# Patient Record
Sex: FEMALE | Race: WHITE | NOT HISPANIC OR LATINO | Employment: OTHER | URBAN - METROPOLITAN AREA
[De-identification: names, ages, dates, MRNs, and addresses within clinical notes are randomized per-mention and may not be internally consistent; named-entity substitution may affect disease eponyms.]

---

## 2018-01-18 ENCOUNTER — RECORDS - HEALTHEAST (OUTPATIENT)
Dept: LAB | Facility: CLINIC | Age: 68
End: 2018-01-18

## 2018-01-18 LAB
ANION GAP SERPL CALCULATED.3IONS-SCNC: 14 MMOL/L (ref 5–18)
BUN SERPL-MCNC: 16 MG/DL (ref 8–22)
CALCIUM SERPL-MCNC: 10.1 MG/DL (ref 8.5–10.5)
CHLORIDE BLD-SCNC: 105 MMOL/L (ref 98–107)
CHOLEST SERPL-MCNC: 226 MG/DL
CO2 SERPL-SCNC: 24 MMOL/L (ref 22–31)
CREAT SERPL-MCNC: 0.87 MG/DL (ref 0.6–1.1)
FASTING STATUS PATIENT QL REPORTED: NO
GFR SERPL CREATININE-BSD FRML MDRD: >60 ML/MIN/1.73M2
GLUCOSE BLD-MCNC: 86 MG/DL (ref 70–125)
HDLC SERPL-MCNC: 104 MG/DL
LDLC SERPL CALC-MCNC: 101 MG/DL
POTASSIUM BLD-SCNC: 5.1 MMOL/L (ref 3.5–5)
SODIUM SERPL-SCNC: 143 MMOL/L (ref 136–145)
TRIGL SERPL-MCNC: 106 MG/DL
TSH SERPL DL<=0.005 MIU/L-ACNC: 2.15 UIU/ML (ref 0.3–5)

## 2018-01-19 ENCOUNTER — RECORDS - HEALTHEAST (OUTPATIENT)
Dept: ADMINISTRATIVE | Facility: OTHER | Age: 68
End: 2018-01-19

## 2018-01-19 LAB
25(OH)D3 SERPL-MCNC: 49.2 NG/ML (ref 30–80)
HCV AB SERPL QL IA: NEGATIVE

## 2018-06-07 ENCOUNTER — RECORDS - HEALTHEAST (OUTPATIENT)
Dept: LAB | Facility: CLINIC | Age: 68
End: 2018-06-07

## 2018-06-07 LAB
CHOLEST SERPL-MCNC: 288 MG/DL
FASTING STATUS PATIENT QL REPORTED: NO
HDLC SERPL-MCNC: 92 MG/DL
LDLC SERPL CALC-MCNC: 175 MG/DL
TRIGL SERPL-MCNC: 107 MG/DL

## 2018-06-18 ENCOUNTER — AMBULATORY - HEALTHEAST (OUTPATIENT)
Dept: CARDIOLOGY | Facility: CLINIC | Age: 68
End: 2018-06-18

## 2018-06-18 ENCOUNTER — RECORDS - HEALTHEAST (OUTPATIENT)
Dept: ADMINISTRATIVE | Facility: OTHER | Age: 68
End: 2018-06-18

## 2018-06-20 ENCOUNTER — RECORDS - HEALTHEAST (OUTPATIENT)
Dept: ADMINISTRATIVE | Facility: OTHER | Age: 68
End: 2018-06-20

## 2018-06-20 ENCOUNTER — AMBULATORY - HEALTHEAST (OUTPATIENT)
Dept: CARDIOLOGY | Facility: CLINIC | Age: 68
End: 2018-06-20

## 2018-06-25 ENCOUNTER — OFFICE VISIT - HEALTHEAST (OUTPATIENT)
Dept: CARDIOLOGY | Facility: CLINIC | Age: 68
End: 2018-06-25

## 2018-06-25 DIAGNOSIS — R07.9 CHEST PAIN, UNSPECIFIED TYPE: ICD-10-CM

## 2018-06-25 DIAGNOSIS — E78.00 HYPERCHOLESTEROLEMIA: ICD-10-CM

## 2018-06-25 DIAGNOSIS — R00.2 PALPITATIONS: ICD-10-CM

## 2018-06-25 ASSESSMENT — MIFFLIN-ST. JEOR: SCORE: 984.68

## 2018-07-19 ENCOUNTER — COMMUNICATION - HEALTHEAST (OUTPATIENT)
Dept: CARDIOLOGY | Facility: HOSPITAL | Age: 68
End: 2018-07-19

## 2018-07-24 ENCOUNTER — HOSPITAL ENCOUNTER (OUTPATIENT)
Dept: CT IMAGING | Facility: CLINIC | Age: 68
Discharge: HOME | End: 2018-07-24
Attending: INTERNAL MEDICINE

## 2018-07-24 ENCOUNTER — HOSPITAL ENCOUNTER (OUTPATIENT)
Dept: CARDIOLOGY | Facility: CLINIC | Age: 68
Discharge: HOME | End: 2018-07-24
Attending: INTERNAL MEDICINE

## 2018-07-24 ENCOUNTER — COMMUNICATION - HEALTHEAST (OUTPATIENT)
Dept: CARDIOLOGY | Facility: CLINIC | Age: 68
End: 2018-07-24

## 2018-07-24 DIAGNOSIS — R07.9 CHEST PAIN, UNSPECIFIED TYPE: ICD-10-CM

## 2018-07-24 DIAGNOSIS — R00.2 PALPITATIONS: ICD-10-CM

## 2018-07-24 LAB
BSA FOR ECHO PROCEDURE: 1.51 M2
CREAT BLD-MCNC: 0.9 MG/DL
CV CALCIUM SCORE AGATSTON LM: 0
CV CALCIUM SCORING AGATSON LAD: 0
CV CALCIUM SCORING AGATSTON CX: 0
CV CALCIUM SCORING AGATSTON RCA: 0
CV CALCIUM SCORING AGATSTON TOTAL: 0
LEFT VENTRICLE HEART RATE: 75 BPM
POC GFR AMER AF HE - HISTORICAL: >60 ML/MIN/1.73M2
POC GFR NON AMER AF HE - HISTORICAL: >60 ML/MIN/1.73M2

## 2018-07-24 ASSESSMENT — MIFFLIN-ST. JEOR
SCORE: 984.68
SCORE: 984.68

## 2018-11-26 ENCOUNTER — RECORDS - HEALTHEAST (OUTPATIENT)
Dept: LAB | Facility: CLINIC | Age: 68
End: 2018-11-26

## 2018-11-26 LAB
CHOLEST SERPL-MCNC: 175 MG/DL
FASTING STATUS PATIENT QL REPORTED: NO
HDLC SERPL-MCNC: 80 MG/DL
LDLC SERPL CALC-MCNC: 74 MG/DL
TRIGL SERPL-MCNC: 107 MG/DL

## 2018-12-18 ENCOUNTER — RECORDS - HEALTHEAST (OUTPATIENT)
Dept: LAB | Facility: CLINIC | Age: 68
End: 2018-12-18

## 2018-12-18 LAB
ALBUMIN SERPL-MCNC: 4.2 G/DL (ref 3.5–5)
ALP SERPL-CCNC: 60 U/L (ref 45–120)
ALT SERPL W P-5'-P-CCNC: 34 U/L (ref 0–45)
ANION GAP SERPL CALCULATED.3IONS-SCNC: 13 MMOL/L (ref 5–18)
AST SERPL W P-5'-P-CCNC: 26 U/L (ref 0–40)
BILIRUB SERPL-MCNC: 0.7 MG/DL (ref 0–1)
BUN SERPL-MCNC: 14 MG/DL (ref 8–22)
CALCIUM SERPL-MCNC: 10.1 MG/DL (ref 8.5–10.5)
CHLORIDE BLD-SCNC: 103 MMOL/L (ref 98–107)
CO2 SERPL-SCNC: 23 MMOL/L (ref 22–31)
CREAT SERPL-MCNC: 0.85 MG/DL (ref 0.6–1.1)
GFR SERPL CREATININE-BSD FRML MDRD: >60 ML/MIN/1.73M2
GLUCOSE BLD-MCNC: 89 MG/DL (ref 70–125)
POTASSIUM BLD-SCNC: 4.2 MMOL/L (ref 3.5–5)
PROT SERPL-MCNC: 6.6 G/DL (ref 6–8)
SODIUM SERPL-SCNC: 139 MMOL/L (ref 136–145)
TSH SERPL DL<=0.005 MIU/L-ACNC: 1.87 UIU/ML (ref 0.3–5)

## 2018-12-19 LAB — 25(OH)D3 SERPL-MCNC: 47.3 NG/ML (ref 30–80)

## 2019-06-28 ENCOUNTER — RECORDS - HEALTHEAST (OUTPATIENT)
Dept: LAB | Facility: CLINIC | Age: 69
End: 2019-06-28

## 2019-06-29 LAB — BACTERIA SPEC CULT: NO GROWTH

## 2019-09-25 ENCOUNTER — RECORDS - HEALTHEAST (OUTPATIENT)
Dept: LAB | Facility: CLINIC | Age: 69
End: 2019-09-25

## 2019-09-25 LAB
ALBUMIN SERPL-MCNC: 4.1 G/DL (ref 3.5–5)
ALP SERPL-CCNC: 72 U/L (ref 45–120)
ALT SERPL W P-5'-P-CCNC: 20 U/L (ref 0–45)
ANION GAP SERPL CALCULATED.3IONS-SCNC: 7 MMOL/L (ref 5–18)
AST SERPL W P-5'-P-CCNC: 25 U/L (ref 0–40)
BILIRUB SERPL-MCNC: 0.6 MG/DL (ref 0–1)
BUN SERPL-MCNC: 13 MG/DL (ref 8–22)
C REACTIVE PROTEIN LHE: 0.1 MG/DL (ref 0–0.8)
CALCIUM SERPL-MCNC: 9.9 MG/DL (ref 8.5–10.5)
CHLORIDE BLD-SCNC: 105 MMOL/L (ref 98–107)
CO2 SERPL-SCNC: 26 MMOL/L (ref 22–31)
CREAT SERPL-MCNC: 0.8 MG/DL (ref 0.6–1.1)
FOLATE SERPL-MCNC: 9.6 NG/ML
GFR SERPL CREATININE-BSD FRML MDRD: >60 ML/MIN/1.73M2
GLUCOSE BLD-MCNC: 91 MG/DL (ref 70–125)
POTASSIUM BLD-SCNC: 4.1 MMOL/L (ref 3.5–5)
PROT SERPL-MCNC: 6.5 G/DL (ref 6–8)
SODIUM SERPL-SCNC: 138 MMOL/L (ref 136–145)
TSH SERPL DL<=0.005 MIU/L-ACNC: 1.25 UIU/ML (ref 0.3–5)
VIT B12 SERPL-MCNC: 359 PG/ML (ref 213–816)

## 2019-09-26 LAB — 25(OH)D3 SERPL-MCNC: 53.4 NG/ML (ref 30–80)

## 2020-02-03 ENCOUNTER — RECORDS - HEALTHEAST (OUTPATIENT)
Dept: LAB | Facility: CLINIC | Age: 70
End: 2020-02-03

## 2020-02-03 LAB
ALBUMIN SERPL-MCNC: 3.9 G/DL (ref 3.5–5)
ALP SERPL-CCNC: 57 U/L (ref 45–120)
ALT SERPL W P-5'-P-CCNC: 27 U/L (ref 0–45)
ANION GAP SERPL CALCULATED.3IONS-SCNC: 10 MMOL/L (ref 5–18)
AST SERPL W P-5'-P-CCNC: 23 U/L (ref 0–40)
BILIRUB SERPL-MCNC: 0.6 MG/DL (ref 0–1)
BUN SERPL-MCNC: 14 MG/DL (ref 8–22)
CALCIUM SERPL-MCNC: 9.6 MG/DL (ref 8.5–10.5)
CHLORIDE BLD-SCNC: 107 MMOL/L (ref 98–107)
CHOLEST SERPL-MCNC: 203 MG/DL
CO2 SERPL-SCNC: 26 MMOL/L (ref 22–31)
CREAT SERPL-MCNC: 0.77 MG/DL (ref 0.6–1.1)
FASTING STATUS PATIENT QL REPORTED: YES
GFR SERPL CREATININE-BSD FRML MDRD: >60 ML/MIN/1.73M2
GLUCOSE BLD-MCNC: 86 MG/DL (ref 70–125)
HDLC SERPL-MCNC: 85 MG/DL
LDLC SERPL CALC-MCNC: 88 MG/DL
POTASSIUM BLD-SCNC: 4.1 MMOL/L (ref 3.5–5)
PROT SERPL-MCNC: 6.2 G/DL (ref 6–8)
SODIUM SERPL-SCNC: 143 MMOL/L (ref 136–145)
TRIGL SERPL-MCNC: 152 MG/DL

## 2021-04-16 ENCOUNTER — RECORDS - HEALTHEAST (OUTPATIENT)
Dept: ADMINISTRATIVE | Facility: OTHER | Age: 71
End: 2021-04-16

## 2021-04-16 ENCOUNTER — AMBULATORY - HEALTHEAST (OUTPATIENT)
Dept: CARDIOLOGY | Facility: CLINIC | Age: 71
End: 2021-04-16

## 2021-04-22 ENCOUNTER — RECORDS - HEALTHEAST (OUTPATIENT)
Dept: LAB | Facility: CLINIC | Age: 71
End: 2021-04-22

## 2021-04-22 LAB
ANION GAP SERPL CALCULATED.3IONS-SCNC: 7 MMOL/L (ref 5–18)
BUN SERPL-MCNC: 12 MG/DL (ref 8–28)
CALCIUM SERPL-MCNC: 9.5 MG/DL (ref 8.5–10.5)
CHLORIDE BLD-SCNC: 107 MMOL/L (ref 98–107)
CHOLEST SERPL-MCNC: 169 MG/DL
CO2 SERPL-SCNC: 30 MMOL/L (ref 22–31)
CREAT SERPL-MCNC: 0.74 MG/DL (ref 0.6–1.1)
FASTING STATUS PATIENT QL REPORTED: NO
GFR SERPL CREATININE-BSD FRML MDRD: >60 ML/MIN/1.73M2
GLUCOSE BLD-MCNC: 86 MG/DL (ref 70–125)
HDLC SERPL-MCNC: 73 MG/DL
LDLC SERPL CALC-MCNC: 75 MG/DL
POTASSIUM BLD-SCNC: 4.1 MMOL/L (ref 3.5–5)
SODIUM SERPL-SCNC: 144 MMOL/L (ref 136–145)
TRIGL SERPL-MCNC: 107 MG/DL

## 2021-05-24 ENCOUNTER — RECORDS - HEALTHEAST (OUTPATIENT)
Dept: ADMINISTRATIVE | Facility: OTHER | Age: 71
End: 2021-05-24

## 2021-05-24 ENCOUNTER — RECORDS - HEALTHEAST (OUTPATIENT)
Dept: CARDIOLOGY | Facility: CLINIC | Age: 71
End: 2021-05-24

## 2021-05-25 ENCOUNTER — RECORDS - HEALTHEAST (OUTPATIENT)
Dept: ADMINISTRATIVE | Facility: CLINIC | Age: 71
End: 2021-05-25

## 2021-05-27 ENCOUNTER — OFFICE VISIT - HEALTHEAST (OUTPATIENT)
Dept: CARDIOLOGY | Facility: CLINIC | Age: 71
End: 2021-05-27

## 2021-05-27 DIAGNOSIS — E78.00 HYPERCHOLESTEROLEMIA: ICD-10-CM

## 2021-05-27 DIAGNOSIS — R00.2 PALPITATIONS: ICD-10-CM

## 2021-05-27 DIAGNOSIS — I34.0 NONRHEUMATIC MITRAL VALVE REGURGITATION: ICD-10-CM

## 2021-05-27 DIAGNOSIS — R07.9 CHEST PAIN, UNSPECIFIED TYPE: ICD-10-CM

## 2021-05-27 ASSESSMENT — MIFFLIN-ST. JEOR: SCORE: 1011.9

## 2021-05-28 ENCOUNTER — RECORDS - HEALTHEAST (OUTPATIENT)
Dept: ADMINISTRATIVE | Facility: CLINIC | Age: 71
End: 2021-05-28

## 2021-06-01 VITALS — HEIGHT: 61 IN | BODY MASS INDEX: 22.28 KG/M2 | WEIGHT: 118 LBS

## 2021-06-01 VITALS — WEIGHT: 118 LBS | HEIGHT: 61 IN | BODY MASS INDEX: 22.28 KG/M2

## 2021-06-10 ENCOUNTER — HOSPITAL ENCOUNTER (OUTPATIENT)
Dept: CARDIOLOGY | Facility: HOSPITAL | Age: 71
Discharge: HOME | End: 2021-06-10
Attending: INTERNAL MEDICINE
Payer: COMMERCIAL

## 2021-06-10 DIAGNOSIS — R00.2 PALPITATIONS: ICD-10-CM

## 2021-06-19 NOTE — PROGRESS NOTES
Patient is going to her cabin for the month of August with poor cell phone service After discussion with patient, patient would like to do UMANG monitor for 30 days in September when back from cabin vacation. Patient has also stated she has not had palpitations for 3 weeks now. Patient will call to make appointment after vacation.

## 2021-06-19 NOTE — PROGRESS NOTES
CTA with calcium score complete.  Rhythm SR 70's.  Metoprolol 5 mg given IV once.  Instructed to increase fluids throughout the day.  Interpretation pending.

## 2021-06-23 ENCOUNTER — HOSPITAL ENCOUNTER (OUTPATIENT)
Dept: CARDIOLOGY | Facility: HOSPITAL | Age: 71
Discharge: HOME | End: 2021-06-23
Attending: INTERNAL MEDICINE
Payer: COMMERCIAL

## 2021-06-23 DIAGNOSIS — I34.0 NONRHEUMATIC MITRAL VALVE REGURGITATION: ICD-10-CM

## 2021-06-23 DIAGNOSIS — R00.2 PALPITATIONS: ICD-10-CM

## 2021-06-23 LAB
AORTIC ROOT: 3 CM
AORTIC VALVE MEAN VELOCITY: 86 CM/S
AV DIMENSIONLESS INDEX VTI: 0.7
AV MEAN GRADIENT: 3 MMHG
AV PEAK GRADIENT: 5.9 MMHG
AV VALVE AREA: 2.1 CM2
BSA FOR ECHO PROCEDURE: 1.54 M2
CV BLOOD PRESSURE: NORMAL MMHG
CV ECHO HEIGHT: 60.1 IN
CV ECHO WEIGHT: 124 LBS
DOP CALC AO PEAK VEL: 121 CM/S
DOP CALC AO VTI: 25 CM
DOP CALC LVOT AREA: 2.83 CM2
DOP CALC LVOT DIAMETER: 1.9 CM
DOP CALC LVOT STROKE VOLUME: 52.7 CM3
DOP CALCLVOT PEAK VEL VTI: 18.6 CM
EJECTION FRACTION: 63 % (ref 55–75)
FRACTIONAL SHORTENING: 34.1 % (ref 28–44)
INTERVENTRICULAR SEPTUM IN END DIASTOLE: 0.6 CM (ref 0.6–0.9)
IVS/PW RATIO: 0.8
LA AREA 1: 14.4 CM2
LA AREA 2: 13.1 CM2
LEFT ATRIUM LENGTH: 4.29 CM
LEFT ATRIUM SIZE: 3.2 CM
LEFT ATRIUM TO AORTIC ROOT RATIO: 1.07 NO UNITS
LEFT ATRIUM VOLUME INDEX: 24.3 ML/M2
LEFT ATRIUM VOLUME: 37.4 ML
LEFT VENTRICLE DIASTOLIC VOLUME INDEX: 38.3 CM3/M2 (ref 29–61)
LEFT VENTRICLE DIASTOLIC VOLUME: 59 CM3 (ref 46–106)
LEFT VENTRICLE MASS INDEX: 53 G/M2
LEFT VENTRICLE SYSTOLIC VOLUME INDEX: 14.3 CM3/M2 (ref 8–24)
LEFT VENTRICLE SYSTOLIC VOLUME: 22 CM3 (ref 14–42)
LEFT VENTRICULAR INTERNAL DIMENSION IN DIASTOLE: 4.1 CM (ref 3.8–5.2)
LEFT VENTRICULAR INTERNAL DIMENSION IN SYSTOLE: 2.7 CM (ref 2.2–3.5)
LEFT VENTRICULAR MASS: 81.7 G
LEFT VENTRICULAR OUTFLOW TRACT MEAN GRADIENT: 2 MMHG
LEFT VENTRICULAR OUTFLOW TRACT MEAN VELOCITY: 60.9 CM/S
LEFT VENTRICULAR POSTERIOR WALL IN END DIASTOLE: 0.8 CM (ref 0.6–0.9)
LV STROKE VOLUME INDEX: 34.2 ML/M2
MITRAL VALVE E/A RATIO: 0.7
MV AVERAGE E/E' RATIO: 7.8 CM/S
MV DECELERATION TIME: 236 MS
MV E'TISSUE VEL-LAT: 8.77 CM/S
MV E'TISSUE VEL-MED: 5.95 CM/S
MV LATERAL E/E' RATIO: 6.5
MV MEDIAL E/E' RATIO: 9.6
MV PEAK A VELOCITY: 78 CM/S
MV PEAK E VELOCITY: 57.3 CM/S
NUC REST DIASTOLIC VOLUME INDEX: 1984 LBS
NUC REST SYSTOLIC VOLUME INDEX: 60.05 IN
TRICUSPID REGURGITATION PEAK PRESSURE GRADIENT: 20.1 MMHG
TRICUSPID VALVE ANULAR PLANE SYSTOLIC EXCURSION: 1.9 CM
TRICUSPID VALVE PEAK REGURGITANT VELOCITY: 224 CM/S

## 2021-06-23 ASSESSMENT — MIFFLIN-ST. JEOR: SCORE: 1004.75

## 2021-06-26 NOTE — PATIENT INSTRUCTIONS - HE
1. Continue current medications  2. Set up echo and event monitor and we will contact you with results

## 2021-06-26 NOTE — PROGRESS NOTES
Progress Notes by Jennifer Chung MD at 6/25/2018  1:50 PM     Author: Jennifer Chung MD Service: -- Author Type: Physician    Filed: 6/25/2018  3:11 PM Encounter Date: 6/25/2018 Status: Signed    : Jennifer Chung MD (Physician)           Click to link to [facility] HEART CARE NOTE    Thank you, Dr. Lombardo, for asking the [facility] Heart Care team to see Ms. Radha Bull to evaluate palpitations and intermittent chest pain.    Assessment/Recommendations   Assessment:    1.  Palpitations, etiology unclear.  These may represent occasional PACs or PVCs but cannot exclude paroxysms of atrial fibrillation.  Her episodes appear to occur sporadically, roughly every 2-3 weeks.  She did wear a 2 week event monitor 17 years ago which was unremarkable.  I suggested a 4 week event monitor.  2.  Intermittent chest pain.  She has had a history of intermittent chest pain dating back nearly 20 years now.  She did undergo a stress echo study in 2001 which was negative for ischemia.  I did suggest consideration of CT coronary angiography as this would also allow us to assess plaque burden even if no significant disease is identified given her risk factor of hypercholesterolemia.  Did explain the procedure with her and she is agreeable to proceed.  3.  Hypercholesterolemia, now back on simvastatin      Plan:  1.  Arrange outpatient CT coronary angiography in 4 week event monitor.  Further recommendations pending those results.       History of Present Illness    Ms. Radha Bull is a 68 y.o. female with history of intermittent chest pain but negative stress echo studies in the past, the last in 2005, and palpitations presents today for follow-up visit.  Patient states that intermittently over the past year, she has experienced episodes of chest discomfort which she describes as squeezing in nature and substernal in location.  There is no radiation to the neck, jaw or arms.  She denies any associated  "nausea or diaphoresis but does feel mildly dyspneic.  The episodes last about 10 minutes and resolved.  There does not appear to be any precipitating factor.  As above, she has undergone stress echo studies in 2001 and again in 2005 both of which were unremarkable.  She also reports symptoms of palpitations which she describes as a sense of \"a tennis shoe rolling around in a dryer\".  This also appears to occur sporadically.  Again no precipitating event such as stress or caffeine use.    ECG (personally reviewed): Recent ECG performed at [facility] demonstrating normal sinus rhythm without acute changes    Cardiac Imaging Studies (personally reviewed): No recent cardiac imaging     Physical Examination Review of Systems   Vitals:    06/25/18 1424   BP: 120/80   Pulse: 72   Resp: 16     Body mass index is 22.67 kg/(m^2).  Wt Readings from Last 3 Encounters:   06/25/18 118 lb (53.5 kg)   08/25/16 130 lb (59 kg)     General Appearance:   Awake, Alert, No acute distress.   HEENT:  No scleral icterus; the mucous membranes were pink and moist.   Neck: No cervical bruits or jugular venous distention    Chest: The spine was straight. The chest was symmetric.   Lungs:   Respirations unlabored; the lungs are clear to auscultation. No wheezing   Cardiovascular:    Regular rate and rhythm.  S1, S2 normal.  No murmur, gallop or rub   Abdomen:  No organomegaly, masses, bruits, or tenderness. Bowels sounds are present   Extremities:  No peripheral edema, clubbing or cyanosis.  Distal pulses are symmetric.   Skin: No xanthelasma. Warm, Dry.   Musculoskeletal: No tenderness.   Neurologic: Mood and affect are appropriate.    General: WNL  Eyes: WNL  Ears/Nose/Throat: WNL  Lungs: Shortness of Breath  Heart: Chest Pain, Shortness of Breath with activity, Irregular Heartbeat  Stomach: Diarrhea  Bladder: WNL  Muscle/Joints: Joint Pain, Muscle Weakness  Skin: WNL  Nervous System: WNL  Mental Health: Depression, Anxiety     Blood: " Easy Bruising     Medical History  Surgical History Family History Social History   Past Medical History:   Diagnosis Date   ? Autoimmune disorder (H)    ? Fibromyalgia    ? History of anesthesia complications     vomiting and headache   ? Lupus    ? Migraine headache     No past surgical history on file. Family History   Problem Relation Age of Onset   ? Cancer Father    ? Multiple sclerosis Sister     Social History     Social History   ? Marital status:      Spouse name: N/A   ? Number of children: N/A   ? Years of education: N/A     Occupational History   ? Not on file.     Social History Main Topics   ? Smoking status: Never Smoker   ? Smokeless tobacco: Never Used   ? Alcohol use No   ? Drug use: No   ? Sexual activity: Not on file     Other Topics Concern   ? Not on file     Social History Narrative          Medications  Allergies   Current Outpatient Prescriptions   Medication Sig Dispense Refill   ? alendronate (FOSAMAX) 35 MG tablet Take 35 mg by mouth every 7 days. Take in the morning on an empty stomach with a full glass of water 30 minutes before food     ? buPROPion (WELLBUTRIN XL) 300 MG 24 hr tablet Take 300 mg by mouth daily.     ? cholecalciferol, vitamin D3, 1,000 unit tablet Take 2,000 Units by mouth daily.     ? escitalopram oxalate (LEXAPRO) 20 MG tablet Take 30 mg by mouth daily.     ? HYDROcodone-acetaminophen 5-325 mg per tablet Take 1 tablet by mouth every 6 (six) hours as needed for pain.     ? lidocaine (LIDODERM) 5 % Place 1-2 patches on the skin daily as needed. Remove & Discard patch within 12 hours or as directed by MD     ? pramipexole (MIRAPEX) 0.125 MG tablet Take 0.125 mg by mouth daily as needed.     ? propranolol (INDERAL) 10 MG tablet Take 10 mg by mouth 2 (two) times a day as needed.     ? rizatriptan (MAXALT) 5 MG tablet Take 5 mg by mouth daily as needed.     ? simvastatin (ZOCOR) 40 MG tablet Take 20 mg by mouth at bedtime.      ? diphenoxylate-atropine (LOMOTIL)  2.5-0.025 mg per tablet Take 1 tablet by mouth every morning.     ? eszopiclone (LUNESTA) tablet Take 1.5 mg by mouth bedtime. Take immediately before bedtime     ? ondansetron (ZOFRAN ODT) 4 MG disintegrating tablet Take 1 tablet (4 mg total) by mouth every 8 (eight) hours as needed for nausea. 8 tablet 0   ? ZOLMitriptan (ZOMIG) 2.5 MG tablet Take 2.5 mg by mouth see administration instructions. Take 2.5 mg at onset of headache. May repeat 2.5 mg in 1 hour as needed for ongoing headache.       No current facility-administered medications for this visit.       Allergies   Allergen Reactions   ? Droperidol Other (See Comments)     Lock jaw   ? Penicillins Hives   ? Sulfa (Sulfonamide Antibiotics) Nausea And Vomiting   ? Sulfacetamide Sodium Nausea And Vomiting         Lab Results    Chemistry/lipid CBC Cardiac Enzymes/BNP/TSH/INR   Lab Results   Component Value Date    CHOL 288 (H) 06/07/2018    HDL 92 06/07/2018    LDLCALC 175 (H) 06/07/2018    TRIG 107 06/07/2018    CREATININE 0.87 01/18/2018    BUN 16 01/18/2018    K 5.1 (H) 01/18/2018     01/18/2018     01/18/2018    CO2 24 01/18/2018    Lab Results   Component Value Date    WBC 5.1 08/25/2016    HGB 13.6 08/25/2016    HCT 41.3 08/25/2016    MCV 96 08/25/2016     08/25/2016    Lab Results   Component Value Date    CKTOTAL 124 10/01/2015    TSH 2.15 01/18/2018

## 2021-07-04 NOTE — PROGRESS NOTES
Progress Notes by Jennifer Chung MD at 5/27/2021 11:10 AM     Author: Jennifer Chung MD Service: -- Author Type: Physician    Filed: 5/27/2021  2:24 PM Encounter Date: 5/27/2021 Status: Signed    : Jennifer Chung MD (Physician)           Thank you, Dr. Lombardo, for asking the [facility] Heart Care team to see Ms. Radha Bull to evaluate palpitations and chest pain.    Assessment/Recommendations   Assessment:    1.  Palpitations, etiology unclear but based on her description, wonder whether it may be due to PVCs or paroxysms of atrial fibrillation.  When I saw her 3 years ago, I recommended 4-week event monitor in the hopes of making a diagnosis.  She never followed through on this.  Recommended we reschedule her for 1 now.  2.  Chest pain, possibly related to #1.  This was also complained 3 years ago.  We subsequently pursued CT coronary angiography which was normal.  No additional ischemic workup needed.  3.  Hypercholesterolemia, back on low-dose simvastatin  4.  Mild mitral insufficiency.  This was documented on echocardiogram many years ago.  Did recommend follow-up echo.    Plan:  1.  Continue current medications  2.  Pursue 4-week event monitor as previously recommended.  We will follow-up once monitoring is completed.  3.  Schedule echocardiogram to reevaluate mild valvular heart disease.       History of Present Illness    Ms. Radha Bull is a 71 y.o. female with history of lupus, migraine headaches, fibromyalgia who I have seen intermittently for symptoms of chest pain.  She had undergone stress studies in the past which were unrevealing.  3 years ago, she presented back to the office with complaints of intermittent chest pain as well as palpitations.  At that point recommended CT coronary angiography as well as a 4-week event monitor since her episodes of palpitations did not occur on a daily basis.  The CT coronary angiography was normal with a calcium score of 0 and minimal plaquing noted  in her coronary arteries.  Unfortunately she did not follow through on the 4-week event monitor.    Today, she returns to the office again with complaints of some intermittent chest pain as well as palpitations.  She states she feels that her heart is flopping all over when the symptoms occur.  Denies any prolonged episodes or associated lightheadedness or near syncope.  Unclear if the chest discomfort correlates with the symptoms.    ECG (personally reviewed): No ECG today    Cardiac Imaging Studies (personally reviewed): No recent imaging     Physical Examination Review of Systems   Vitals:    05/27/21 1123   BP: 128/70   Pulse: 73   Resp: 14     Body mass index is 23.82 kg/m .  Wt Readings from Last 3 Encounters:   05/27/21 124 lb (56.2 kg)   07/24/18 118 lb (53.5 kg)   06/25/18 118 lb (53.5 kg)     General Appearance:   Awake, Alert, No acute distress.   HEENT:  No scleral icterus; the mucous membranes were pink and moist.   Neck: No cervical bruits or jugular venous distention    Chest: The spine was straight. The chest was symmetric.   Lungs:   Respirations unlabored; the lungs are clear to auscultation. No wheezing   Cardiovascular:    Regular rate and rhythm.  S1, S2 normal.  1/6 holosystolic murmur heard at the apex   Abdomen:  No organomegaly, masses, bruits, or tenderness. Bowels sounds are present   Extremities:  No peripheral edema bilaterally.   Skin: No xanthelasma. Warm, Dry.   Musculoskeletal: No tenderness.   Neurologic: Mood and affect are appropriate.    General: WNL  Eyes: WNL  Ears/Nose/Throat: Hearing Loss  Lungs: WNL  Heart: Chest Pain, Shortness of Breath with activity, Irregular Heartbeat  Stomach: WNL  Bladder: WNL  Muscle/Joints: Joint Pain  Skin: WNL  Nervous System: Loss of Balance  Mental Health: Depression     Blood: Easy Bleeding, Easy Bruising     Medical History  Surgical History Family History Social History   Past Medical History:   Diagnosis Date     Autoimmune disorder (H)       Fibromyalgia      History of anesthesia complications     vomiting and headache     Lupus      Migraine headache     No past surgical history on file. Family History   Problem Relation Age of Onset     Cancer Father      Multiple sclerosis Sister     Social History     Socioeconomic History     Marital status:      Spouse name: Not on file     Number of children: Not on file     Years of education: Not on file     Highest education level: Not on file   Occupational History     Not on file   Social Needs     Financial resource strain: Not on file     Food insecurity     Worry: Not on file     Inability: Not on file     Transportation needs     Medical: Not on file     Non-medical: Not on file   Tobacco Use     Smoking status: Never Smoker     Smokeless tobacco: Never Used   Substance and Sexual Activity     Alcohol use: No     Drug use: No     Sexual activity: Not on file   Lifestyle     Physical activity     Days per week: Not on file     Minutes per session: Not on file     Stress: Not on file   Relationships     Social connections     Talks on phone: Not on file     Gets together: Not on file     Attends Shinto service: Not on file     Active member of club or organization: Not on file     Attends meetings of clubs or organizations: Not on file     Relationship status: Not on file     Intimate partner violence     Fear of current or ex partner: Not on file     Emotionally abused: Not on file     Physically abused: Not on file     Forced sexual activity: Not on file   Other Topics Concern     Not on file   Social History Narrative     Not on file          Medications  Allergies   Current Outpatient Medications   Medication Sig Dispense Refill     buPROPion (WELLBUTRIN XL) 300 MG 24 hr tablet Take 300 mg by mouth daily.       cholecalciferol, vitamin D3, 1,000 unit tablet Take 2,000 Units by mouth daily.       diphenoxylate-atropine (LOMOTIL) 2.5-0.025 mg per tablet Take 1 tablet by mouth every morning.        escitalopram oxalate (LEXAPRO) 20 MG tablet Take 30 mg by mouth daily. 15 mg daily       eszopiclone (LUNESTA) tablet Take 1.5 mg by mouth bedtime. Take immediately before bedtime       HYDROcodone-acetaminophen 5-325 mg per tablet Take 1 tablet by mouth every 6 (six) hours as needed for pain.       pramipexole (MIRAPEX) 0.125 MG tablet Take 0.125 mg by mouth daily as needed.       propranolol (INDERAL) 10 MG tablet Take 10 mg by mouth 2 (two) times a day as needed.       rizatriptan (MAXALT) 5 MG tablet Take 5 mg by mouth daily as needed.       simvastatin (ZOCOR) 40 MG tablet Take 20 mg by mouth at bedtime.        No current facility-administered medications for this visit.       Allergies   Allergen Reactions     Droperidol Other (See Comments)     Lock jaw     Penicillins Hives     Sulfa (Sulfonamide Antibiotics) Nausea And Vomiting     Sulfacetamide Sodium Nausea And Vomiting         Lab Results    Chemistry/lipid CBC Cardiac Enzymes/BNP/TSH/INR   Lab Results   Component Value Date    CHOL 169 04/22/2021    HDL 73 04/22/2021    LDLCALC 75 04/22/2021    TRIG 107 04/22/2021    CREATININE 0.74 04/22/2021    BUN 12 04/22/2021    K 4.1 04/22/2021     04/22/2021     04/22/2021    CO2 30 04/22/2021    Lab Results   Component Value Date    WBC 5.1 08/25/2016    HGB 13.6 08/25/2016    HCT 41.3 08/25/2016    MCV 96 08/25/2016     08/25/2016    Lab Results   Component Value Date    CKTOTAL 124 10/01/2015    TSH 1.25 09/25/2019        A total of 40 minutes was spent reviewing patient's medical records, obtaining history and performing examination, as well as discussing diagnoses/ recommendations with patient and answering all questions.

## 2021-07-06 VITALS — BODY MASS INDEX: 24.35 KG/M2 | HEIGHT: 60 IN | WEIGHT: 124 LBS

## 2021-07-06 VITALS
BODY MASS INDEX: 23.41 KG/M2 | HEART RATE: 73 BPM | HEIGHT: 61 IN | WEIGHT: 124 LBS | RESPIRATION RATE: 14 BRPM | DIASTOLIC BLOOD PRESSURE: 70 MMHG | SYSTOLIC BLOOD PRESSURE: 128 MMHG

## 2021-09-10 DIAGNOSIS — Z11.59 ENCOUNTER FOR SCREENING FOR OTHER VIRAL DISEASES: ICD-10-CM

## 2021-10-14 ENCOUNTER — LAB REQUISITION (OUTPATIENT)
Dept: LAB | Facility: CLINIC | Age: 71
End: 2021-10-14
Payer: MEDICARE

## 2021-10-14 DIAGNOSIS — E55.9 VITAMIN D DEFICIENCY, UNSPECIFIED: ICD-10-CM

## 2021-10-14 DIAGNOSIS — Z01.818 ENCOUNTER FOR OTHER PREPROCEDURAL EXAMINATION: ICD-10-CM

## 2021-10-14 PROCEDURE — 82306 VITAMIN D 25 HYDROXY: CPT | Mod: ORL | Performed by: PHYSICIAN ASSISTANT

## 2021-10-14 PROCEDURE — 80048 BASIC METABOLIC PNL TOTAL CA: CPT | Mod: ORL | Performed by: PHYSICIAN ASSISTANT

## 2021-10-14 RX ORDER — ESCITALOPRAM OXALATE 10 MG/1
15 TABLET ORAL DAILY
COMMUNITY

## 2021-10-14 RX ORDER — MELATONIN 3 MG
1 TABLET ORAL
COMMUNITY

## 2021-10-14 RX ORDER — CHOLECALCIFEROL (VITAMIN D3) 50 MCG
1 TABLET ORAL DAILY
COMMUNITY

## 2021-10-14 RX ORDER — PRAMIPEXOLE DIHYDROCHLORIDE 0.12 MG/1
0.12 TABLET ORAL DAILY PRN
COMMUNITY

## 2021-10-14 RX ORDER — LOPERAMIDE HCL 2 MG
2 CAPSULE ORAL
COMMUNITY

## 2021-10-14 RX ORDER — SENNOSIDES 8.6 MG
650 CAPSULE ORAL EVERY 8 HOURS PRN
Status: ON HOLD | COMMUNITY
End: 2021-10-18

## 2021-10-14 RX ORDER — ESZOPICLONE 3 MG/1
3 TABLET, FILM COATED ORAL AT BEDTIME
COMMUNITY

## 2021-10-14 RX ORDER — BUPROPION HYDROCHLORIDE 300 MG/1
300 TABLET ORAL EVERY MORNING
COMMUNITY

## 2021-10-14 RX ORDER — DICLOFENAC SODIUM 30 MG/G
GEL TOPICAL DAILY PRN
COMMUNITY

## 2021-10-14 RX ORDER — TRIAMCINOLONE ACETONIDE 1 MG/G
OINTMENT TOPICAL DAILY PRN
COMMUNITY

## 2021-10-14 RX ORDER — DICLOFENAC SODIUM 75 MG/1
75 TABLET, DELAYED RELEASE ORAL DAILY PRN
Status: ON HOLD | COMMUNITY
End: 2021-10-18

## 2021-10-14 RX ORDER — RIZATRIPTAN BENZOATE 5 MG/1
5 TABLET ORAL
COMMUNITY

## 2021-10-14 RX ORDER — SIMVASTATIN 40 MG
40 TABLET ORAL EVERY OTHER DAY
COMMUNITY

## 2021-10-14 RX ORDER — PROPRANOLOL HYDROCHLORIDE 10 MG/1
10 TABLET ORAL DAILY PRN
COMMUNITY

## 2021-10-15 ENCOUNTER — LAB (OUTPATIENT)
Dept: LAB | Facility: CLINIC | Age: 71
End: 2021-10-15
Attending: ORTHOPAEDIC SURGERY
Payer: MEDICARE

## 2021-10-15 DIAGNOSIS — Z11.59 ENCOUNTER FOR SCREENING FOR OTHER VIRAL DISEASES: ICD-10-CM

## 2021-10-15 LAB
ANION GAP SERPL CALCULATED.3IONS-SCNC: 10 MMOL/L (ref 5–18)
BUN SERPL-MCNC: 11 MG/DL (ref 8–28)
CALCIUM SERPL-MCNC: 9.7 MG/DL (ref 8.5–10.5)
CHLORIDE BLD-SCNC: 104 MMOL/L (ref 98–107)
CO2 SERPL-SCNC: 27 MMOL/L (ref 22–31)
CREAT SERPL-MCNC: 0.79 MG/DL (ref 0.6–1.1)
GFR SERPL CREATININE-BSD FRML MDRD: 76 ML/MIN/1.73M2
GLUCOSE BLD-MCNC: 85 MG/DL (ref 70–125)
POTASSIUM BLD-SCNC: 4.3 MMOL/L (ref 3.5–5)
SODIUM SERPL-SCNC: 141 MMOL/L (ref 136–145)

## 2021-10-15 PROCEDURE — U0003 INFECTIOUS AGENT DETECTION BY NUCLEIC ACID (DNA OR RNA); SEVERE ACUTE RESPIRATORY SYNDROME CORONAVIRUS 2 (SARS-COV-2) (CORONAVIRUS DISEASE [COVID-19]), AMPLIFIED PROBE TECHNIQUE, MAKING USE OF HIGH THROUGHPUT TECHNOLOGIES AS DESCRIBED BY CMS-2020-01-R: HCPCS

## 2021-10-15 PROCEDURE — U0005 INFEC AGEN DETEC AMPLI PROBE: HCPCS

## 2021-10-15 RX ORDER — LIDOCAINE 4 G/G
1 PATCH TOPICAL EVERY 24 HOURS
COMMUNITY

## 2021-10-15 NOTE — PROGRESS NOTES
PTA medications updated by Medication Scribe prior to surgery via phone call with patient (last doses completed by Nurse)     Medication history sources: Patient, H&P and Patient's home med list  In the past week, patient estimated taking medication this percent of the time: Greater than 90%  Adherence assessment: N/A Not Observed    Significant changes made to the medication list:  None      Additional medication history information:   None    Medication reconciliation completed by provider prior to medication history? No    Time spent in this activity: 25 minutes    The information provided in this note is only as accurate as the sources available at the time of update(s)      Prior to Admission medications    Medication Sig Last Dose Taking? Auth Provider   acetaminophen (TYLENOL 8 HOUR ARTHRITIS PAIN) 650 MG CR tablet Take 650 mg by mouth every 8 hours as needed for mild pain or fever MORE THAN A WEEK at PRN Yes Reported, Patient   BIOTIN PO Take 1 chew tab by mouth 2 times daily 10/14/2021 at PM Yes Reported, Patient   buPROPion (WELLBUTRIN XL) 300 MG 24 hr tablet Take 300 mg by mouth every morning  at AM Yes Reported, Patient   diclofenac (VOLTAREN) 75 MG EC tablet Take 75 mg by mouth daily as needed for moderate pain MORE THAN A WEEK at PRN Yes Reported, Patient   Diclofenac Sodium 3 % GEL Externally apply topically daily as needed  at PRN Yes Reported, Patient   escitalopram (LEXAPRO) 10 MG tablet Take 15 mg by mouth daily (1.5 X 10 mg)  at AM Yes Reported, Patient   eszopiclone (LUNESTA) 3 MG tablet Take 3 mg by mouth At Bedtime  at PM Yes Reported, Patient   Lidocaine (LIDOCARE) 4 % Patch Place 1 patch onto the skin every 24 hours To prevent lidocaine toxicity, patient should be patch free for 12 hrs daily. 10/15/2021 at AM Yes Reported, Patient   loperamide (IMODIUM) 2 MG capsule Take 2 mg by mouth once as needed for diarrhea  at AM Yes Reported, Patient   melatonin 3-10 MG TABS Take 1 tablet by mouth  nightly as needed  at PM Yes Reported, Patient   pramipexole (MIRAPEX) 0.125 MG tablet Take 0.125 mg by mouth daily as needed  at PRN Yes Reported, Patient   Probiotic Product (PROBIOTIC DAILY PO) Take 1 capsule by mouth 2 times daily 10/14/2021 at PM Yes Reported, Patient   propranolol (INDERAL) 10 MG tablet Take 10 mg by mouth daily as needed  at PRN Yes Reported, Patient   rizatriptan (MAXALT) 5 MG tablet Take 5 mg by mouth at onset of headache for migraine  at PRN Yes Reported, Patient   simvastatin (ZOCOR) 40 MG tablet Take 40 mg by mouth every other day  at PM Yes Reported, Patient   triamcinolone (KENALOG) 0.1 % external ointment Apply topically daily as needed for irritation  at PM Yes Reported, Patient   vitamin D3 (CHOLECALCIFEROL) 50 mcg (2000 units) tablet Take 1 tablet by mouth daily 10/14/2021 at AM Yes Reported, Patient       Medication history completed by:    Antonio Palencia CPhT  Medication [facility]

## 2021-10-16 LAB — SARS-COV-2 RNA RESP QL NAA+PROBE: NEGATIVE

## 2021-10-18 ENCOUNTER — APPOINTMENT (OUTPATIENT)
Dept: GENERAL RADIOLOGY | Facility: CLINIC | Age: 71
DRG: 483 | End: 2021-10-18
Attending: STUDENT IN AN ORGANIZED HEALTH CARE EDUCATION/TRAINING PROGRAM
Payer: MEDICARE

## 2021-10-18 ENCOUNTER — ANESTHESIA (OUTPATIENT)
Dept: SURGERY | Facility: CLINIC | Age: 71
DRG: 483 | End: 2021-10-18
Payer: MEDICARE

## 2021-10-18 ENCOUNTER — ANESTHESIA EVENT (OUTPATIENT)
Dept: SURGERY | Facility: CLINIC | Age: 71
DRG: 483 | End: 2021-10-18
Payer: MEDICARE

## 2021-10-18 ENCOUNTER — HOSPITAL ENCOUNTER (INPATIENT)
Facility: CLINIC | Age: 71
LOS: 1 days | Discharge: HOME | DRG: 483 | End: 2021-10-20
Attending: ORTHOPAEDIC SURGERY | Admitting: ORTHOPAEDIC SURGERY
Payer: MEDICARE

## 2021-10-18 DIAGNOSIS — Z98.890 POSTOPERATIVE STATE: ICD-10-CM

## 2021-10-18 DIAGNOSIS — Z71.89 ACP (ADVANCE CARE PLANNING): Primary | ICD-10-CM

## 2021-10-18 DIAGNOSIS — Z96.612 S/P REVERSE TOTAL SHOULDER ARTHROPLASTY, LEFT: ICD-10-CM

## 2021-10-18 LAB — DEPRECATED CALCIDIOL+CALCIFEROL SERPL-MC: 52 UG/L (ref 30–80)

## 2021-10-18 PROCEDURE — 250N000011 HC RX IP 250 OP 636: Performed by: STUDENT IN AN ORGANIZED HEALTH CARE EDUCATION/TRAINING PROGRAM

## 2021-10-18 PROCEDURE — 360N000077 HC SURGERY LEVEL 4, PER MIN: Performed by: ORTHOPAEDIC SURGERY

## 2021-10-18 PROCEDURE — 272N000001 HC OR GENERAL SUPPLY STERILE: Performed by: ORTHOPAEDIC SURGERY

## 2021-10-18 PROCEDURE — 258N000001 HC RX 258: Performed by: ORTHOPAEDIC SURGERY

## 2021-10-18 PROCEDURE — 258N000003 HC RX IP 258 OP 636: Performed by: ANESTHESIOLOGY

## 2021-10-18 PROCEDURE — C1713 ANCHOR/SCREW BN/BN,TIS/BN: HCPCS | Performed by: ORTHOPAEDIC SURGERY

## 2021-10-18 PROCEDURE — 250N000011 HC RX IP 250 OP 636: Performed by: NURSE ANESTHETIST, CERTIFIED REGISTERED

## 2021-10-18 PROCEDURE — 370N000017 HC ANESTHESIA TECHNICAL FEE, PER MIN: Performed by: ORTHOPAEDIC SURGERY

## 2021-10-18 PROCEDURE — 250N000009 HC RX 250: Performed by: NURSE ANESTHETIST, CERTIFIED REGISTERED

## 2021-10-18 PROCEDURE — 999N000141 HC STATISTIC PRE-PROCEDURE NURSING ASSESSMENT: Performed by: ORTHOPAEDIC SURGERY

## 2021-10-18 PROCEDURE — 250N000011 HC RX IP 250 OP 636: Performed by: ORTHOPAEDIC SURGERY

## 2021-10-18 PROCEDURE — 258N000003 HC RX IP 258 OP 636: Performed by: NURSE ANESTHETIST, CERTIFIED REGISTERED

## 2021-10-18 PROCEDURE — 271N000001 HC OR GENERAL SUPPLY NON-STERILE: Performed by: ORTHOPAEDIC SURGERY

## 2021-10-18 PROCEDURE — 250N000009 HC RX 250: Performed by: ANESTHESIOLOGY

## 2021-10-18 PROCEDURE — 710N000009 HC RECOVERY PHASE 1, LEVEL 1, PER MIN: Performed by: ORTHOPAEDIC SURGERY

## 2021-10-18 PROCEDURE — 999N000063 XR SHOULDER LEFT PORT G/E 2 VIEWS: Mod: LT

## 2021-10-18 PROCEDURE — 250N000013 HC RX MED GY IP 250 OP 250 PS 637: Performed by: NURSE PRACTITIONER

## 2021-10-18 PROCEDURE — 250N000009 HC RX 250: Performed by: ORTHOPAEDIC SURGERY

## 2021-10-18 PROCEDURE — 99207 PR NO CHARGE LOS: CPT | Performed by: PHYSICIAN ASSISTANT

## 2021-10-18 PROCEDURE — 250N000013 HC RX MED GY IP 250 OP 250 PS 637: Performed by: STUDENT IN AN ORGANIZED HEALTH CARE EDUCATION/TRAINING PROGRAM

## 2021-10-18 PROCEDURE — 0RRK00Z REPLACEMENT OF LEFT SHOULDER JOINT WITH REVERSE BALL AND SOCKET SYNTHETIC SUBSTITUTE, OPEN APPROACH: ICD-10-PCS | Performed by: ORTHOPAEDIC SURGERY

## 2021-10-18 PROCEDURE — C1776 JOINT DEVICE (IMPLANTABLE): HCPCS | Performed by: ORTHOPAEDIC SURGERY

## 2021-10-18 DEVICE — SCREW PERIPHERAL 26MM: Type: IMPLANTABLE DEVICE | Site: SHOULDER | Status: FUNCTIONAL

## 2021-10-18 DEVICE — SCREW PERIPHERAL 14MM DWJ314: Type: IMPLANTABLE DEVICE | Site: SHOULDER | Status: FUNCTIONAL

## 2021-10-18 DEVICE — IMPLANTABLE DEVICE
Type: IMPLANTABLE DEVICE | Site: SHOULDER | Status: FUNCTIONAL
Brand: AEQUALIS™ PERFORM REVERSED

## 2021-10-18 DEVICE — SCREW CENTRAL 6.5X30MM DWJ130: Type: IMPLANTABLE DEVICE | Site: SHOULDER | Status: FUNCTIONAL

## 2021-10-18 DEVICE — SCREW PERIPHERAL 5.0X30MM DWJ330: Type: IMPLANTABLE DEVICE | Site: SHOULDER | Status: FUNCTIONAL

## 2021-10-18 DEVICE — IMPLANTABLE DEVICE
Type: IMPLANTABLE DEVICE | Site: SHOULDER | Status: FUNCTIONAL
Brand: FLEX SHOULDER SYSTEM

## 2021-10-18 DEVICE — IMPLANTABLE DEVICE
Type: IMPLANTABLE DEVICE | Site: SHOULDER | Status: FUNCTIONAL
Brand: AEQUALIS™ ASCEND™ FLEX

## 2021-10-18 RX ORDER — PROPOFOL 10 MG/ML
INJECTION, EMULSION INTRAVENOUS CONTINUOUS PRN
Status: DISCONTINUED | OUTPATIENT
Start: 2021-10-18 | End: 2021-10-18

## 2021-10-18 RX ORDER — CEFAZOLIN SODIUM 2 G/100ML
2 INJECTION, SOLUTION INTRAVENOUS
Status: COMPLETED | OUTPATIENT
Start: 2021-10-18 | End: 2021-10-18

## 2021-10-18 RX ORDER — GLYCOPYRROLATE 0.2 MG/ML
INJECTION, SOLUTION INTRAMUSCULAR; INTRAVENOUS PRN
Status: DISCONTINUED | OUTPATIENT
Start: 2021-10-18 | End: 2021-10-18

## 2021-10-18 RX ORDER — PRAMIPEXOLE DIHYDROCHLORIDE 0.12 MG/1
0.12 TABLET ORAL DAILY PRN
Status: DISCONTINUED | OUTPATIENT
Start: 2021-10-18 | End: 2021-10-20 | Stop reason: HOSPADM

## 2021-10-18 RX ORDER — BUPROPION HYDROCHLORIDE 150 MG/1
300 TABLET ORAL EVERY MORNING
Status: DISCONTINUED | OUTPATIENT
Start: 2021-10-19 | End: 2021-10-20 | Stop reason: HOSPADM

## 2021-10-18 RX ORDER — FENTANYL CITRATE 0.05 MG/ML
50 INJECTION, SOLUTION INTRAMUSCULAR; INTRAVENOUS
Status: DISCONTINUED | OUTPATIENT
Start: 2021-10-18 | End: 2021-10-18 | Stop reason: HOSPADM

## 2021-10-18 RX ORDER — LIDOCAINE 40 MG/G
CREAM TOPICAL
Status: DISCONTINUED | OUTPATIENT
Start: 2021-10-18 | End: 2021-10-19

## 2021-10-18 RX ORDER — LIDOCAINE 4 G/G
1 PATCH TOPICAL EVERY 24 HOURS
Status: DISCONTINUED | OUTPATIENT
Start: 2021-10-18 | End: 2021-10-20 | Stop reason: HOSPADM

## 2021-10-18 RX ORDER — HYDROMORPHONE HCL IN WATER/PF 6 MG/30 ML
0.2 PATIENT CONTROLLED ANALGESIA SYRINGE INTRAVENOUS EVERY 5 MIN PRN
Status: DISCONTINUED | OUTPATIENT
Start: 2021-10-18 | End: 2021-10-18

## 2021-10-18 RX ORDER — ONDANSETRON 2 MG/ML
INJECTION INTRAMUSCULAR; INTRAVENOUS PRN
Status: DISCONTINUED | OUTPATIENT
Start: 2021-10-18 | End: 2021-10-18

## 2021-10-18 RX ORDER — SENNA AND DOCUSATE SODIUM 50; 8.6 MG/1; MG/1
TABLET, FILM COATED ORAL
Qty: 30 TABLET | Refills: 0 | Status: SHIPPED | OUTPATIENT
Start: 2021-10-18

## 2021-10-18 RX ORDER — NALOXONE HYDROCHLORIDE 0.4 MG/ML
0.2 INJECTION, SOLUTION INTRAMUSCULAR; INTRAVENOUS; SUBCUTANEOUS
Status: DISCONTINUED | OUTPATIENT
Start: 2021-10-18 | End: 2021-10-20 | Stop reason: HOSPADM

## 2021-10-18 RX ORDER — ACETAMINOPHEN 325 MG/1
975 TABLET ORAL ONCE
Status: COMPLETED | OUTPATIENT
Start: 2021-10-18 | End: 2021-10-18

## 2021-10-18 RX ORDER — LIDOCAINE HYDROCHLORIDE 20 MG/ML
INJECTION, SOLUTION INFILTRATION; PERINEURAL PRN
Status: DISCONTINUED | OUTPATIENT
Start: 2021-10-18 | End: 2021-10-18

## 2021-10-18 RX ORDER — SCOLOPAMINE TRANSDERMAL SYSTEM 1 MG/1
1 PATCH, EXTENDED RELEASE TRANSDERMAL ONCE
Status: COMPLETED | OUTPATIENT
Start: 2021-10-18 | End: 2021-10-19

## 2021-10-18 RX ORDER — RIZATRIPTAN BENZOATE 5 MG/1
5 TABLET ORAL
Status: DISCONTINUED | OUTPATIENT
Start: 2021-10-18 | End: 2021-10-20 | Stop reason: HOSPADM

## 2021-10-18 RX ORDER — OXYCODONE HYDROCHLORIDE 5 MG/1
5 TABLET ORAL EVERY 6 HOURS PRN
Qty: 26 TABLET | Refills: 0 | Status: SHIPPED | OUTPATIENT
Start: 2021-10-18 | End: 2021-10-19

## 2021-10-18 RX ORDER — LOPERAMIDE HCL 2 MG
2 CAPSULE ORAL
Status: DISCONTINUED | OUTPATIENT
Start: 2021-10-18 | End: 2021-10-20 | Stop reason: HOSPADM

## 2021-10-18 RX ORDER — DEXAMETHASONE SODIUM PHOSPHATE 4 MG/ML
INJECTION, SOLUTION INTRA-ARTICULAR; INTRALESIONAL; INTRAMUSCULAR; INTRAVENOUS; SOFT TISSUE PRN
Status: DISCONTINUED | OUTPATIENT
Start: 2021-10-18 | End: 2021-10-18

## 2021-10-18 RX ORDER — SIMVASTATIN 40 MG
40 TABLET ORAL EVERY OTHER DAY
Status: DISCONTINUED | OUTPATIENT
Start: 2021-10-18 | End: 2021-10-20 | Stop reason: HOSPADM

## 2021-10-18 RX ORDER — OXYCODONE HYDROCHLORIDE 5 MG/1
10 TABLET ORAL EVERY 4 HOURS PRN
Status: DISCONTINUED | OUTPATIENT
Start: 2021-10-18 | End: 2021-10-19

## 2021-10-18 RX ORDER — VANCOMYCIN HYDROCHLORIDE 1 G/20ML
INJECTION, POWDER, LYOPHILIZED, FOR SOLUTION INTRAVENOUS PRN
Status: DISCONTINUED | OUTPATIENT
Start: 2021-10-18 | End: 2021-10-18 | Stop reason: HOSPADM

## 2021-10-18 RX ORDER — CEFAZOLIN SODIUM 1 G/3ML
1 INJECTION, POWDER, FOR SOLUTION INTRAMUSCULAR; INTRAVENOUS EVERY 8 HOURS
Status: COMPLETED | OUTPATIENT
Start: 2021-10-18 | End: 2021-10-19

## 2021-10-18 RX ORDER — ONDANSETRON 2 MG/ML
4 INJECTION INTRAMUSCULAR; INTRAVENOUS EVERY 6 HOURS PRN
Status: DISCONTINUED | OUTPATIENT
Start: 2021-10-18 | End: 2021-10-20 | Stop reason: HOSPADM

## 2021-10-18 RX ORDER — SODIUM CHLORIDE, SODIUM LACTATE, POTASSIUM CHLORIDE, CALCIUM CHLORIDE 600; 310; 30; 20 MG/100ML; MG/100ML; MG/100ML; MG/100ML
INJECTION, SOLUTION INTRAVENOUS CONTINUOUS
Status: DISCONTINUED | OUTPATIENT
Start: 2021-10-18 | End: 2021-10-20 | Stop reason: HOSPADM

## 2021-10-18 RX ORDER — SODIUM CHLORIDE, SODIUM LACTATE, POTASSIUM CHLORIDE, CALCIUM CHLORIDE 600; 310; 30; 20 MG/100ML; MG/100ML; MG/100ML; MG/100ML
INJECTION, SOLUTION INTRAVENOUS CONTINUOUS
Status: DISCONTINUED | OUTPATIENT
Start: 2021-10-18 | End: 2021-10-18 | Stop reason: HOSPADM

## 2021-10-18 RX ORDER — MAGNESIUM HYDROXIDE 1200 MG/15ML
LIQUID ORAL PRN
Status: DISCONTINUED | OUTPATIENT
Start: 2021-10-18 | End: 2021-10-18 | Stop reason: HOSPADM

## 2021-10-18 RX ORDER — SODIUM CHLORIDE, SODIUM LACTATE, POTASSIUM CHLORIDE, CALCIUM CHLORIDE 600; 310; 30; 20 MG/100ML; MG/100ML; MG/100ML; MG/100ML
INJECTION, SOLUTION INTRAVENOUS CONTINUOUS
Status: DISCONTINUED | OUTPATIENT
Start: 2021-10-18 | End: 2021-10-18

## 2021-10-18 RX ORDER — ONDANSETRON 4 MG/1
4 TABLET, ORALLY DISINTEGRATING ORAL EVERY 6 HOURS PRN
Status: DISCONTINUED | OUTPATIENT
Start: 2021-10-18 | End: 2021-10-20 | Stop reason: HOSPADM

## 2021-10-18 RX ORDER — PROPOFOL 10 MG/ML
INJECTION, EMULSION INTRAVENOUS PRN
Status: DISCONTINUED | OUTPATIENT
Start: 2021-10-18 | End: 2021-10-18

## 2021-10-18 RX ORDER — NALOXONE HYDROCHLORIDE 0.4 MG/ML
0.4 INJECTION, SOLUTION INTRAMUSCULAR; INTRAVENOUS; SUBCUTANEOUS
Status: DISCONTINUED | OUTPATIENT
Start: 2021-10-18 | End: 2021-10-20 | Stop reason: HOSPADM

## 2021-10-18 RX ORDER — ESZOPICLONE 3 MG/1
3 TABLET, FILM COATED ORAL AT BEDTIME
Status: DISCONTINUED | OUTPATIENT
Start: 2021-10-18 | End: 2021-10-20 | Stop reason: HOSPADM

## 2021-10-18 RX ORDER — EPHEDRINE SULFATE 50 MG/ML
INJECTION, SOLUTION INTRAMUSCULAR; INTRAVENOUS; SUBCUTANEOUS PRN
Status: DISCONTINUED | OUTPATIENT
Start: 2021-10-18 | End: 2021-10-18

## 2021-10-18 RX ORDER — PROCHLORPERAZINE MALEATE 5 MG
5 TABLET ORAL EVERY 6 HOURS PRN
Status: DISCONTINUED | OUTPATIENT
Start: 2021-10-18 | End: 2021-10-20 | Stop reason: HOSPADM

## 2021-10-18 RX ORDER — CEFAZOLIN SODIUM 2 G/100ML
2 INJECTION, SOLUTION INTRAVENOUS SEE ADMIN INSTRUCTIONS
Status: DISCONTINUED | OUTPATIENT
Start: 2021-10-18 | End: 2021-10-18 | Stop reason: HOSPADM

## 2021-10-18 RX ORDER — ACETAMINOPHEN 325 MG/1
650 TABLET ORAL EVERY 4 HOURS PRN
Status: DISCONTINUED | OUTPATIENT
Start: 2021-10-21 | End: 2021-10-20 | Stop reason: HOSPADM

## 2021-10-18 RX ORDER — HYDROMORPHONE HYDROCHLORIDE 1 MG/ML
0.5 INJECTION, SOLUTION INTRAMUSCULAR; INTRAVENOUS; SUBCUTANEOUS
Status: DISCONTINUED | OUTPATIENT
Start: 2021-10-18 | End: 2021-10-20 | Stop reason: HOSPADM

## 2021-10-18 RX ORDER — PROPRANOLOL HYDROCHLORIDE 10 MG/1
10 TABLET ORAL DAILY PRN
Status: DISCONTINUED | OUTPATIENT
Start: 2021-10-18 | End: 2021-10-20 | Stop reason: HOSPADM

## 2021-10-18 RX ORDER — AMOXICILLIN 250 MG
1 CAPSULE ORAL 2 TIMES DAILY
Status: DISCONTINUED | OUTPATIENT
Start: 2021-10-18 | End: 2021-10-20 | Stop reason: HOSPADM

## 2021-10-18 RX ORDER — FENTANYL CITRATE 50 UG/ML
INJECTION, SOLUTION INTRAMUSCULAR; INTRAVENOUS PRN
Status: DISCONTINUED | OUTPATIENT
Start: 2021-10-18 | End: 2021-10-18

## 2021-10-18 RX ORDER — TRANEXAMIC ACID 650 MG/1
1950 TABLET ORAL ONCE
Status: COMPLETED | OUTPATIENT
Start: 2021-10-18 | End: 2021-10-18

## 2021-10-18 RX ORDER — NEOSTIGMINE METHYLSULFATE 1 MG/ML
VIAL (ML) INJECTION PRN
Status: DISCONTINUED | OUTPATIENT
Start: 2021-10-18 | End: 2021-10-18

## 2021-10-18 RX ORDER — ONDANSETRON 2 MG/ML
4 INJECTION INTRAMUSCULAR; INTRAVENOUS EVERY 30 MIN PRN
Status: DISCONTINUED | OUTPATIENT
Start: 2021-10-18 | End: 2021-10-18

## 2021-10-18 RX ORDER — ONDANSETRON 4 MG/1
4 TABLET, ORALLY DISINTEGRATING ORAL EVERY 30 MIN PRN
Status: DISCONTINUED | OUTPATIENT
Start: 2021-10-18 | End: 2021-10-18

## 2021-10-18 RX ORDER — ACETAMINOPHEN 325 MG/1
975 TABLET ORAL EVERY 8 HOURS
Status: DISCONTINUED | OUTPATIENT
Start: 2021-10-18 | End: 2021-10-20 | Stop reason: HOSPADM

## 2021-10-18 RX ORDER — FENTANYL CITRATE 0.05 MG/ML
25 INJECTION, SOLUTION INTRAMUSCULAR; INTRAVENOUS EVERY 5 MIN PRN
Status: DISCONTINUED | OUTPATIENT
Start: 2021-10-18 | End: 2021-10-18

## 2021-10-18 RX ORDER — OXYCODONE HYDROCHLORIDE 5 MG/1
5 TABLET ORAL EVERY 4 HOURS PRN
Status: DISCONTINUED | OUTPATIENT
Start: 2021-10-18 | End: 2021-10-18

## 2021-10-18 RX ADMIN — ACETAMINOPHEN 975 MG: 325 TABLET, FILM COATED ORAL at 09:57

## 2021-10-18 RX ADMIN — GLYCOPYRROLATE 0.6 MG: 0.2 INJECTION, SOLUTION INTRAMUSCULAR; INTRAVENOUS at 13:33

## 2021-10-18 RX ADMIN — LIDOCAINE HYDROCHLORIDE 60 MG: 20 INJECTION, SOLUTION INFILTRATION; PERINEURAL at 11:42

## 2021-10-18 RX ADMIN — Medication 20 ML: at 11:00

## 2021-10-18 RX ADMIN — ESZOPICLONE 3 MG: 3 TABLET, FILM COATED OROPHARYNGEAL at 21:54

## 2021-10-18 RX ADMIN — TRANEXAMIC ACID 1950 MG: 650 TABLET ORAL at 09:57

## 2021-10-18 RX ADMIN — Medication 8 MCG: at 12:29

## 2021-10-18 RX ADMIN — PROPOFOL 100 MCG/KG/MIN: 10 INJECTION, EMULSION INTRAVENOUS at 11:42

## 2021-10-18 RX ADMIN — Medication 10 MG: at 12:13

## 2021-10-18 RX ADMIN — ROCURONIUM BROMIDE 50 MG: 50 INJECTION, SOLUTION INTRAVENOUS at 11:42

## 2021-10-18 RX ADMIN — DEXAMETHASONE SODIUM PHOSPHATE 4 MG: 4 INJECTION, SOLUTION INTRA-ARTICULAR; INTRALESIONAL; INTRAMUSCULAR; INTRAVENOUS; SOFT TISSUE at 12:09

## 2021-10-18 RX ADMIN — PROPOFOL 50 MG: 10 INJECTION, EMULSION INTRAVENOUS at 11:58

## 2021-10-18 RX ADMIN — SIMVASTATIN 40 MG: 40 TABLET, FILM COATED ORAL at 21:18

## 2021-10-18 RX ADMIN — ASPIRIN 324 MG: 81 TABLET, COATED ORAL at 18:57

## 2021-10-18 RX ADMIN — PROPOFOL 150 MG: 10 INJECTION, EMULSION INTRAVENOUS at 11:42

## 2021-10-18 RX ADMIN — ONDANSETRON 4 MG: 2 INJECTION INTRAMUSCULAR; INTRAVENOUS at 13:20

## 2021-10-18 RX ADMIN — PHENYLEPHRINE HYDROCHLORIDE 0.2 MCG/KG/MIN: 10 INJECTION INTRAVENOUS at 12:04

## 2021-10-18 RX ADMIN — FENTANYL CITRATE 50 MCG: 50 INJECTION, SOLUTION INTRAMUSCULAR; INTRAVENOUS at 11:34

## 2021-10-18 RX ADMIN — NEOSTIGMINE METHYLSULFATE 3 MG: 1 INJECTION, SOLUTION INTRAVENOUS at 13:33

## 2021-10-18 RX ADMIN — LIDOCAINE 1 PATCH: 560 PATCH PERCUTANEOUS; TOPICAL; TRANSDERMAL at 21:19

## 2021-10-18 RX ADMIN — SCOPALAMINE 1 PATCH: 1 PATCH, EXTENDED RELEASE TRANSDERMAL at 10:24

## 2021-10-18 RX ADMIN — ROCURONIUM BROMIDE 20 MG: 50 INJECTION, SOLUTION INTRAVENOUS at 12:56

## 2021-10-18 RX ADMIN — Medication 12 MCG: at 13:35

## 2021-10-18 RX ADMIN — CEFAZOLIN 1 G: 1 INJECTION, POWDER, FOR SOLUTION INTRAMUSCULAR; INTRAVENOUS at 21:52

## 2021-10-18 RX ADMIN — ACETAMINOPHEN 975 MG: 325 TABLET, FILM COATED ORAL at 18:58

## 2021-10-18 RX ADMIN — CEFAZOLIN SODIUM 2 G: 2 INJECTION, SOLUTION INTRAVENOUS at 11:33

## 2021-10-18 RX ADMIN — SODIUM CHLORIDE, POTASSIUM CHLORIDE, SODIUM LACTATE AND CALCIUM CHLORIDE: 600; 310; 30; 20 INJECTION, SOLUTION INTRAVENOUS at 10:11

## 2021-10-18 ASSESSMENT — ACTIVITIES OF DAILY LIVING (ADL)
ADLS_ACUITY_SCORE: 5

## 2021-10-18 ASSESSMENT — MIFFLIN-ST. JEOR: SCORE: 1013.71

## 2021-10-18 NOTE — PROGRESS NOTES
Patient vital signs are at baseline: yes, SBP 90's-100's        Patient able to ambulate as they were prior to admission or with assist devices provided by therapies during their stay:  Pt up with SBA, ambulated ~100feet    Patient MUST void prior to discharge:  Yes, Pt able to void on toilet with no issue    Patient able to tolerate oral intake:  Yes, pt tolerating PO. Denies N/V    Pain has adequate pain control using Oral analgesics:  Nerve block to left shoulder in effect, pt denies pain. Left arm numb, CMS otherwise intact.

## 2021-10-18 NOTE — ANESTHESIA CARE TRANSFER NOTE
Patient: Radha Bull    Procedure: Procedure(s):  LEFT REVERSE SHOULDER ARTHROPLASTY WITH NO CUSTOM GUIDE       Diagnosis: Glenohumeral arthritis, left [M19.012]  Partial nontraumatic tear of rotator cuff, left [M75.112]  Diagnosis Additional Information: No value filed.    Anesthesia Type:   General     Note:    Oropharynx: oropharynx clear of all foreign objects and spontaneously breathing  Level of Consciousness: awake  Oxygen Supplementation: face mask  Level of Supplemental Oxygen (L/min / FiO2): 6  Independent Airway: airway patency satisfactory and stable  Dentition: dentition unchanged  Vital Signs Stable: post-procedure vital signs reviewed and stable  Report to RN Given: handoff report given  Patient transferred to: PACU  Comments: Pt was extubated after SV, adequate TV, cough reflex, airway suctioned,  4/4,  sust tet and following commands.  Handoff Report: Identifed the Patient, Identified the Reponsible Provider, Reviewed the pertinent medical history, Discussed the surgical course, Reviewed Intra-OP anesthesia mangement and issues during anesthesia, Set expectations for post-procedure period and Allowed opportunity for questions and acknowledgement of understanding      Vitals:  Vitals Value Taken Time   /59 10/18/21 1346   Temp 36.5  C (97.7  F) 10/18/21 1346   Pulse 82 10/18/21 1349   Resp 22 10/18/21 1349   SpO2 98 % 10/18/21 1349   Vitals shown include unvalidated device data.    Electronically Signed By: ANGELICA Cook CRNA  October 18, 2021  1:50 PM

## 2021-10-18 NOTE — ANESTHESIA PROCEDURE NOTES
Interscalene Procedure Note    Pre-Procedure   Staff -        Anesthesiologist:  Jennifer Sosa       Performed By: anesthesiologist       Location: pre-op       Pre-Anesthestic Checklist: patient identified, IV checked, site marked, risks and benefits discussed, informed consent, monitors and equipment checked, pre-op evaluation, at physician/surgeon's request and post-op pain management  Timeout:       Correct Patient: Yes        Correct Procedure: Yes        Correct Site: Yes        Correct Position: Yes        Correct Laterality: Yes        Site Marked: Yes  Procedure Documentation  Procedure: Interscalene       Laterality: left       Patient Position: sitting       Skin prep: Chloraprep      Local skin infiltrated with mL of 1% lidocaine.        Needle Type: insulated       Needle Length (millimeters): 50        Catheter: 19 G.          Ultrasound guided       1. Ultrasound was used to identify targeted nerve, plexus, vascular marker, or fascial plane and place a needle adjacent to it in real-time.       2. Ultrasound was used to visualize the spread of anesthetic in close proximity to the above referenced structure.       3. A permanent image is entered into the patient's record.       4. The visualized anatomic structures appeared normal.       5. There were no apparent abnormal pathologic findings.    Assessment/Narrative         The placement was negative for: blood aspirated, painful injection and site bleeding       Paresthesias: No.      Bolus given via needle. No blood aspirated via catheter.        Secured via.        Insertion/Infusion Method: Single Shot       Complications: none    Medication(s) Administered   Ropivacaine 0.5% w/ 1:400K Epi (Injection), 20 mL  Medication Administration Time: 10/18/2021 11:00 AM

## 2021-10-18 NOTE — ANESTHESIA POSTPROCEDURE EVALUATION
Patient: Radha Bull    Procedure: Procedure(s):  LEFT REVERSE SHOULDER ARTHROPLASTY WITH NO CUSTOM GUIDE       Diagnosis:Glenohumeral arthritis, left [M19.012]  Partial nontraumatic tear of rotator cuff, left [M75.112]  Diagnosis Additional Information: No value filed.    Anesthesia Type:  General    Note:  Disposition: Admission   Postop Pain Control: Uneventful            Sign Out: Well controlled pain   PONV: No   Neuro/Psych: Uneventful            Sign Out: Acceptable/Baseline neuro status   Airway/Respiratory: Uneventful            Sign Out: Acceptable/Baseline resp. status   CV/Hemodynamics: Uneventful            Sign Out: Acceptable CV status   Other NRE:    DID A NON-ROUTINE EVENT OCCUR? No           Last vitals:  Vitals Value Taken Time   BP 88/53 10/18/21 1424   Temp 36.5  C (97.7  F) 10/18/21 1346   Pulse 75 10/18/21 1427   Resp 18 10/18/21 1427   SpO2 97 % 10/18/21 1427   Vitals shown include unvalidated device data.    Electronically Signed By: Jennifer Sosa  October 18, 2021  2:29 PM

## 2021-10-18 NOTE — CONSULTS
Hospitalist note    Consult received for med reconciliation for admit and discharge.  Preop evaluation reviewed, PMH includes HLD, depression, chronic recurrent migraines, fibromyalgia, SLE, IBS vs spastic colon, mild aortic insufficiency and chronic neck pain who underwent total shoulder arthroplasty 10/18/21.  Pt BPs softer (per pre-op H&P, /80), remainder of vitals stable.     In the absence of any acute medical issues, hospitalist service will defer formal consultation but feel free to reconsult us if any any medical issues arise.  I have reconciled pt's PTA meds for admit & discharge.

## 2021-10-18 NOTE — OP NOTE
DATE OF SERVICE: 10/18/2021      SURGEON:  Keyur Oquendo MD    ASSISTANT:  Rupal Jerez PA-C    PROCEDURE:  Reverse total shoulder arthroplasty on the left .    PREOPERATIVE DIAGNOSIS:  Glenohumeral arthritis, left [M19.012]  Partial nontraumatic tear of rotator cuff, left [M75.112]      POSTOPERATIVE DIAGNOSIS:  Same      ANESTHESIA:  General with Interscalene Block.     ESTIMATED BLOOD LOSS:75cc        SPECIMENS:   Resected humeral head, not sent.    DRAINS   None     IMPLANTS:  Tornier Aequalis Ascend Reverse Total Shoulder:  Glenosphere Perform baseplate, 25+3mm with 30x6.5mm central screw  Fixed locking screw x2, lengths 26mm and 30mm  Non-locking screw x2, lengths 14mm and 14mm  Glenosphere, size 36mm eccentric  Primary shoulder stem, size 2b  Humeral Tray, size High offset  Humeral Bearing, size 6mm      NARRATIVE:  After discussing the risks, benefits, possible complications and alternatives, the patient voiced their understanding and gave consent . The patient wished to proceed.  An interscalene block was administered. The patient was then brought to the operating room and placed in a supine position. Following administration of general anesthetic, the patient was positioned in the modified beach chair position. All bony prominences were well-padded. A lateral chest pad was placed, and the head was secured with a Mac head meadows with safety goggles in place.    After sterile prep and drape, a standard deltopectoral incision   was made, carried down through the skin and soft tissue. Electrocautery was used for hemostasis. Cephalic vein was taken laterally with the deltoid, and was noted to be intact throughout the case and at closure. The deltopectoral fascia was incised to approach the shoulder.    Anterior cuff is intact. Superior cuff is damaged and atrophic.    The biceps was tenodesed at the superior edge of the bicipital groove.  The subscapularis tendon is tenotomized 1cm medial to the insertion  area for later repair.  This allows delivery of the head from the incision, after which a starting point   for the proximal guide was then well-visualized.  The proximal cut guide is used to check the anatomic version which is approximately 30 degrees. This was cut in 30 degrees of retroversion. Following this, it was then reamed and broached up to a size 2.  This was left in place and the protective cap was placed.     A Fukuda retractor was used to displace the humeral head posteriorly.  The glenoid was then prepared resecting the labrum in a 360 degree arc, and removing the residual biceps stump.  Once the appropriate approach had been obtained, the guide for the central pin was then positioned at the inferior aspect of the glenoid with a 0 degree inferior tilt.  The central pin was passed and checked along the medial scapular neck with my direct digital palpation, and noted to be in excellent position.  The pin was then reamed over to prepare the glenoid baseplate. There is good glenoid bone for fixation.    The baseplate is fully seated and drilled. Appropriate length locking screws were placed x4 with excellent fixation.  The cone reamer was used to remove any glenoid prominence.  The glenosphere was impacted into place and the central screw was fully tightened. This shows excellent fixation and good clearance inferiorly.    Following this, attention was turned to the humerus.  The baseplate was then trialed with the high offset metaphyseal tray and  6mm bearing, showing excellent fit, stability, and rotation. The arm could be brought through 150 degrees of passive forward flexion and 80 degrees of passive external rotation without any opening of the anterior joint line.  Instruments were removed. Final implants were seated after copious irrigation.  Using the same standard bearing and tray, this shows excellent stability after reduction.  1000 mL of brown wash followed by 1000 mL of normal saline was used for  irrigation.    The subscapularis was then reattached with transtendon figure eight sutures x7.  This was done with the arm abducted 40 degrees and externally rotated 30 degrees to allow excellent excursion.  Copious irrigation was performed again with pulsatile lavage. The deltopectoral interval was closed with 0 Vicryl. The subcutaneous tissue was closed with 3-0 Vicryl and skin with 4-0 Monocryl. Generic Prineo dressing was applied.     Vancomycin powder 1gm total was used during the case and was applied to the intramedulary canal prior to stem placement, the joint space prior to subscapularis repair and the deep and superficial wound layers.    A physician assistant was necessary for this case to protect the neurovascular structures when retracting.  Maintaining the position of the arm during implantation of the components, Assisting with the dislocation of the implant trials and maintaining a clear operative field during the case. The PA is present for the entirety of the case.    The patient went to the recovery room in good condition, and will have a standard reverse total shoulder rehabilitation course.     COMPLICATIONS:  None.     CONDITION UPON DISCHARGE FOR OPERATING ROOM:  Stable    PLAN:  1. Antibiotic prophylaxis were given including Ancef. Abx given within 1 hr of surgical incision and discontinued within 24hrs.   2. DVT prophylaxis: aspirin and sequential compression device's will be started within 24hrs of surgery.   3. Weight Bearing NWB (Non weight bearing) left .upper extremity   4. Discharge anticipated date POD# 1 to home.   5. Pain Medication oxycodone and Dilaudid.  6. Exercises: Full elbow, wrist, and hand AROM/PROM on operative side 5x/day.  Encourage ADLs out of sling as tolerated.

## 2021-10-18 NOTE — ANESTHESIA PROCEDURE NOTES
Airway       Patient location during procedure: OR       Procedure Start/Stop Times: 10/18/2021 11:45 AM  Staff -        CRNA: Madisyn Hearn APRN CRNA       Performed By: CRNA  Consent for Airway        Urgency: elective  Indications and Patient Condition       Indications for airway management: tiffanie-procedural       Induction type:intravenous       Mask difficulty assessment: 1 - vent by mask    Final Airway Details       Final airway type: endotracheal airway    Endotracheal Airway Details        Cuffed: yes       Successful intubation technique: direct laryngoscopy       DL Blade Type: MAC 3       Grade View of Cords: 1       Adjucts: stylet       Position: Right       Measured from: lips       Secured at (cm): 21       Bite block used: None    Post intubation assessment        Placement verified by: capnometry, equal breath sounds and chest rise        Number of attempts at approach: 1       Number of other approaches attempted: 0       Secured with: pink tape       Ease of procedure: easy       Dentition: Intact and Unchanged

## 2021-10-18 NOTE — ANESTHESIA PREPROCEDURE EVALUATION
Anesthesia Pre-Procedure Evaluation    Patient: Radha Bull   MRN: [id number] : 1950        Preoperative Diagnosis: Glenohumeral arthritis, left [M19.012]  Partial nontraumatic tear of rotator cuff, left [M75.112]    Procedure : Procedure(s):  LEFT REVERSE SHOULDER ARTHROPLASTY WITH NO CUSTOM GUIDE          Past Medical History:   Diagnosis Date     Aortic insufficiency      Arthritis      Chronic neck pain      Colon polyps      Depressive disorder      Fibromyalgia      HLD (hyperlipidemia)      Insomnia      Migraine      Osteopenia      PONV (postoperative nausea and vomiting)      Rectal prolapse      SLE (systemic lupus erythematosus related syndrome) (H)      Spastic colon     possible IBS      Past Surgical History:   Procedure Laterality Date     ABDOMEN SURGERY      lap x 2 for infertility     APPENDECTOMY       AS REPAIR OF RECTUM,PROLAPSE MUCOSA       COLONOSCOPY       COLONOSCOPY N/A 2016    Procedure: COMBINED COLONOSCOPY, SINGLE OR MULTIPLE BIOPSY/POLYPECTOMY BY BIOPSY;  Surgeon: Madisyn Rodrigues MD;  Location:  GI     ENT SURGERY      removal of mass in lymph node of neck     Neck mass removed      benign     oral grafting       ORTHOPEDIC SURGERY Left     trigger finger     RECTAL PROLAPSE REPAIR       TONSILLECTOMY & ADENOIDECTOMY        Allergies   Allergen Reactions     Droperidol Other (See Comments)     Lock jaw     Pcn [Penicillins]      Sulfa Drugs Nausea and Vomiting      Social History     Tobacco Use     Smoking status: Never Smoker     Smokeless tobacco: Never Used   Substance Use Topics     Alcohol use: Yes     Comment: 4 glasses of wine a week      Wt Readings from Last 1 Encounters:   10/18/21 56.4 kg (124 lb 6.4 oz)        Anesthesia Evaluation   Pt has had prior anesthetic.     History of anesthetic complications (severe)  - PONV.      ROS/MED HX  ENT/Pulmonary:    (-) sleep apnea   Neurologic:     (+) migraines,     Cardiovascular: Comment: Mild AI    (+)  Dyslipidemia -----Irregular Heartbeat/Palpitations,     METS/Exercise Tolerance: >4 METS    Hematologic:       Musculoskeletal: Comment: fobromyalgia  (+) arthritis,     GI/Hepatic: Comment: IBS      Renal/Genitourinary:       Endo:       Psychiatric/Substance Use:     (+) psychiatric history depression     Infectious Disease:       Malignancy:       Other: Comment: LUPUS     (+) , H/O Chronic Pain,        Physical Exam    Airway        Mallampati: I   TM distance: > 3 FB   Neck ROM: full     Respiratory Devices and Support         Dental       (+) caps      Cardiovascular   cardiovascular exam normal          Pulmonary                   OUTSIDE LABS:  CBC: No results found for: WBC, HGB, HCT, PLT  BMP:   Lab Results   Component Value Date     10/14/2021     04/22/2021    POTASSIUM 4.3 10/14/2021    POTASSIUM 4.1 04/22/2021    CHLORIDE 104 10/14/2021    CHLORIDE 107 04/22/2021    CO2 27 10/14/2021    CO2 30 04/22/2021    BUN 11 10/14/2021    BUN 12 04/22/2021    CR 0.79 10/14/2021    CR 0.74 04/22/2021    GLC 85 10/14/2021    GLC 86 04/22/2021     COAGS: No results found for: PTT, INR, FIBR  POC: No results found for: BGM, HCG, HCGS  HEPATIC:   Lab Results   Component Value Date    ALBUMIN 3.9 02/03/2020    PROTTOTAL 6.2 02/03/2020    ALT 27 02/03/2020    AST 23 02/03/2020    ALKPHOS 57 02/03/2020    BILITOTAL 0.6 02/03/2020     OTHER:   Lab Results   Component Value Date    CHINEDU 9.7 10/14/2021    TSH 1.25 09/25/2019    CRP 0.1 09/25/2019       Anesthesia Plan    ASA Status:  2      Anesthesia Type: General.     - Airway: ETT              Consents    Anesthesia Plan(s) and associated risks, benefits, and realistic alternatives discussed. Questions answered and patient/representative(s) expressed understanding.     - Discussed with:  Patient         Postoperative Care    Pain management: Peripheral nerve block (Continuous).   PONV prophylaxis: Ondansetron (or other 5HT-3), Dexamethasone or Solumedrol,  Background Propofol Infusion     Comments:    CHAYA Sosa

## 2021-10-19 LAB
GLUCOSE BLDC GLUCOMTR-MCNC: 124 MG/DL (ref 70–99)
HGB BLD-MCNC: 11.2 G/DL (ref 11.7–15.7)

## 2021-10-19 PROCEDURE — 250N000013 HC RX MED GY IP 250 OP 250 PS 637: Performed by: STUDENT IN AN ORGANIZED HEALTH CARE EDUCATION/TRAINING PROGRAM

## 2021-10-19 PROCEDURE — 82962 GLUCOSE BLOOD TEST: CPT

## 2021-10-19 PROCEDURE — 99221 1ST HOSP IP/OBS SF/LOW 40: CPT | Performed by: NURSE PRACTITIONER

## 2021-10-19 PROCEDURE — 99207 PR CONSULT E&M CHANGED TO INITIAL LEVEL: CPT | Performed by: NURSE PRACTITIONER

## 2021-10-19 PROCEDURE — 250N000011 HC RX IP 250 OP 636: Performed by: NURSE PRACTITIONER

## 2021-10-19 PROCEDURE — 85018 HEMOGLOBIN: CPT | Performed by: STUDENT IN AN ORGANIZED HEALTH CARE EDUCATION/TRAINING PROGRAM

## 2021-10-19 PROCEDURE — 120N000001 HC R&B MED SURG/OB

## 2021-10-19 PROCEDURE — 258N000003 HC RX IP 258 OP 636: Performed by: STUDENT IN AN ORGANIZED HEALTH CARE EDUCATION/TRAINING PROGRAM

## 2021-10-19 PROCEDURE — 258N000003 HC RX IP 258 OP 636: Performed by: NURSE PRACTITIONER

## 2021-10-19 PROCEDURE — 36415 COLL VENOUS BLD VENIPUNCTURE: CPT | Performed by: STUDENT IN AN ORGANIZED HEALTH CARE EDUCATION/TRAINING PROGRAM

## 2021-10-19 PROCEDURE — 250N000013 HC RX MED GY IP 250 OP 250 PS 637: Performed by: PHYSICIAN ASSISTANT

## 2021-10-19 PROCEDURE — 250N000013 HC RX MED GY IP 250 OP 250 PS 637: Performed by: NURSE PRACTITIONER

## 2021-10-19 PROCEDURE — 250N000011 HC RX IP 250 OP 636: Performed by: STUDENT IN AN ORGANIZED HEALTH CARE EDUCATION/TRAINING PROGRAM

## 2021-10-19 RX ORDER — ACETAMINOPHEN 325 MG/1
650 TABLET ORAL EVERY 4 HOURS PRN
Qty: 100 TABLET | Refills: 0 | Status: SHIPPED | OUTPATIENT
Start: 2021-10-21

## 2021-10-19 RX ORDER — RIZATRIPTAN BENZOATE 10 MG/1
10 TABLET ORAL
Status: DISCONTINUED | OUTPATIENT
Start: 2021-10-19 | End: 2021-10-20 | Stop reason: HOSPADM

## 2021-10-19 RX ORDER — OXYCODONE HYDROCHLORIDE 5 MG/1
5-10 TABLET ORAL EVERY 4 HOURS PRN
Status: DISCONTINUED | OUTPATIENT
Start: 2021-10-19 | End: 2021-10-20 | Stop reason: HOSPADM

## 2021-10-19 RX ORDER — OXYCODONE HYDROCHLORIDE 5 MG/1
5 TABLET ORAL EVERY 6 HOURS PRN
Qty: 26 TABLET | Refills: 0 | Status: SHIPPED | OUTPATIENT
Start: 2021-10-19

## 2021-10-19 RX ADMIN — CEFAZOLIN 1 G: 1 INJECTION, POWDER, FOR SOLUTION INTRAMUSCULAR; INTRAVENOUS at 04:01

## 2021-10-19 RX ADMIN — MAGNESIUM SULFATE HEPTAHYDRATE 1 G: 500 INJECTION, SOLUTION INTRAMUSCULAR; INTRAVENOUS at 11:48

## 2021-10-19 RX ADMIN — HYDROMORPHONE HYDROCHLORIDE 0.5 MG: 1 INJECTION, SOLUTION INTRAMUSCULAR; INTRAVENOUS; SUBCUTANEOUS at 02:31

## 2021-10-19 RX ADMIN — OXYCODONE HYDROCHLORIDE 10 MG: 5 TABLET ORAL at 16:08

## 2021-10-19 RX ADMIN — SODIUM CHLORIDE, POTASSIUM CHLORIDE, SODIUM LACTATE AND CALCIUM CHLORIDE: 600; 310; 30; 20 INJECTION, SOLUTION INTRAVENOUS at 08:06

## 2021-10-19 RX ADMIN — ESZOPICLONE 3 MG: 3 TABLET, FILM COATED OROPHARYNGEAL at 23:08

## 2021-10-19 RX ADMIN — ONDANSETRON 4 MG: 2 INJECTION INTRAMUSCULAR; INTRAVENOUS at 05:32

## 2021-10-19 RX ADMIN — ESCITALOPRAM 15 MG: 5 TABLET, FILM COATED ORAL at 11:09

## 2021-10-19 RX ADMIN — BUPROPION HYDROCHLORIDE 300 MG: 150 TABLET, FILM COATED, EXTENDED RELEASE ORAL at 11:10

## 2021-10-19 RX ADMIN — ACETAMINOPHEN 975 MG: 325 TABLET, FILM COATED ORAL at 08:30

## 2021-10-19 RX ADMIN — ACETAMINOPHEN 975 MG: 325 TABLET, FILM COATED ORAL at 16:07

## 2021-10-19 RX ADMIN — RIZATRIPTAN BENZOATE 5 MG: 5 TABLET ORAL at 01:42

## 2021-10-19 RX ADMIN — ASPIRIN 325 MG: 81 TABLET, COATED ORAL at 08:30

## 2021-10-19 RX ADMIN — RIZATRIPTAN BENZOATE 5 MG: 5 TABLET ORAL at 11:10

## 2021-10-19 RX ADMIN — OXYCODONE HYDROCHLORIDE 5 MG: 5 TABLET ORAL at 19:54

## 2021-10-19 RX ADMIN — ACETAMINOPHEN 975 MG: 325 TABLET, FILM COATED ORAL at 00:20

## 2021-10-19 RX ADMIN — OXYCODONE HYDROCHLORIDE 5 MG: 5 TABLET ORAL at 00:59

## 2021-10-19 RX ADMIN — LIDOCAINE 1 PATCH: 560 PATCH PERCUTANEOUS; TOPICAL; TRANSDERMAL at 16:00

## 2021-10-19 RX ADMIN — PROCHLORPERAZINE EDISYLATE 5 MG: 5 INJECTION INTRAMUSCULAR; INTRAVENOUS at 10:22

## 2021-10-19 RX ADMIN — OXYCODONE HYDROCHLORIDE 5 MG: 5 TABLET ORAL at 00:20

## 2021-10-19 ASSESSMENT — ACTIVITIES OF DAILY LIVING (ADL): TOILETING_ISSUES: NO

## 2021-10-19 NOTE — PROGRESS NOTES
Patient vital signs are at baseline  Yes  Patient able to ambulate as they were prior to admission or with assist devices provided by therapies during their stay: yes Yes  Patient MUST void prior to discharge: yes Yes  Patient able to tolerate oral intake:yes    Pain has adequate pain control using Oral analgesics:  No,  Reason:  Required one dose of IV dilaudid for breakthrough pain    Pt ambulated to the bathroom and around desk tonight approximately 150 feet.  Patient alert and oriented.  Dressing CDI, ice applied to incision.  Pain not controlled with oxy and tylenol, one dose IV dilaudid given with good relief.  Will continue to monitor.

## 2021-10-19 NOTE — PLAN OF CARE
Patient vital signs are at baseline: Yes  Patient able to ambulate as they were prior to admission or with assist devices provided by therapies during their stay:  Yes  Patient MUST void prior to discharge:  Yes  Patient able to tolerate oral intake:  Yes  Pain has adequate pain control using Oral analgesics:  Yes    Arrived to floor at around 8:15 AM from PACU. A/Ox4. VSS at 2L NC. Up SBA to BR, voiding well. She was very nauseated and had a very bad migraine when she came on the floor. As well as dizziness. PRN Compazine and Maxalt given with help. Sleeping on and off. CMS intact and L shoulder dressing CDI. Pain more on a headache this morning, was resolved after PRN meds for Migraine.

## 2021-10-19 NOTE — PROGRESS NOTES
Patient vital signs are at baseline: Yes  Patient able to ambulate as they were prior to admission or with assist devices provided by therapies during their stay:  Yes  Patient MUST void prior to discharge:  Yes  Patient able to tolerate oral intake:  Yes  Pain has adequate pain control using Oral analgesics:  Yes     Pt ambulating to the bathroom x 2 approximately 75 feet. Patient alert and oriented x 4 . Dressing CDI. Ice applied to surgical incision. No pain . Sling in place. Will continue to monitor.

## 2021-10-19 NOTE — PROGRESS NOTES
"ORTHOPEDIC UPPER EXTREMITY PROGRESS NOTE    POD# 1  Patient is a 71 year old female who underwent Procedure(s):  LEFT REVERSE SHOULDER ARTHROPLASTY WITH NO CUSTOM GUIDE on 10/18/2021. Pain is moderately controlled. She is currently rating her pain 7/10.  Received IV dilaudid at 0230 and oxycodone at 0059. Her main concern now is her nausea, pain behind her eyes and dizziness.  She gets recurrent migraines.  She is also having back pain.  Has  for support when she goes home.    Vitals:   Blood pressure (!) 142/77, pulse 74, temperature 97.6  F (36.4  C), resp. rate 17, height 1.537 m (5' 0.5\"), weight 56.4 kg (124 lb 6.4 oz), SpO2 96 %.  Temp (24hrs), Av  F (36.7  C), Min:97.6  F (36.4  C), Max:98.3  F (36.8  C)      EXAM   The patient is sleepy but answering questions appropriately while keeping eyes closed.  Sensation is intact.  Digital Flexion/Extension maintained.   Brisk cap refill.   The incision is covered with bulky dressing.    Labs:   Recent Labs   Lab Test 10/19/21  0525   HGB 11.2*       ASSESSMENT  S/p L rev TSA   Chronic recurrent migraines with acute migraine   PLAN  1. Continue with shoulder immobilizer.  Okay to remove for ADLs.  Okay for active range of motion for elbow, wrist and digits.  2. Weight Bearing NWB (Non weight bearing).  3. Wound Care leave undisturbed. Bulky dressing to be removed by patient on POD#2.  Prineo dressing to be removed 3 weeks postoperatively.  4. Discharge anticipated date today or tomorrow pending resolution of migraine and clearance by medicine Home with No Skilled Service  5. Cont Pain Control: oxycodone and tylenol  6. DVT prophylaxis: aspirin  7. Recurrent migraines: re-consulted hospitalist.  Appreciate assistance    Cinthya Yen PA-C  [facility]      "

## 2021-10-19 NOTE — PROGRESS NOTES
BRIEF ORTHO NOTE:    Floor nurse called this afternoon to say medicine is recommending another night stay due to ongoing migraine symptoms. She continues to have dizziness.  She spent the night in the PACU and didn't arrive to the floor until 8:15am this morning.  Will continue to monitor her for another night and admit to inpatient.  Medicine is continuing to follow, appreciate assistance.    Cinthya Yen PA-C  [facility]

## 2021-10-19 NOTE — PLAN OF CARE
[id number] shift update:   A&Ox4. VSS on RA. Denies chest pain and SOB. Ambulated to B/R. Up SBA. Slight lightheaded on ambulation, but improving per Pt. Left shoulder pain managed with prn Oxycodone and scheduled Tylenol. Using ice. CMS intact. NWB to LUE. Sling on. Tolerating reg diet. Denies nausea. No c/o migraine. Encouraged oral fluid and IS. Plan is discharge to home tomorrow.

## 2021-10-19 NOTE — CONSULTS
"[facility]  Consult Note - Hospitalist Service     Date of Admission:  10/18/2021  Consult Requested by: Azra  Reason for Consult: migraine    Assessment & Plan   Radha Bull is a 71 year old female admitted on 10/18/2021. She has a past medical history of dyslipidemia, depression, recurrent migraines, fibromyalgia, SLE, IBS vs spastic colon, mild aortic insufficiency, chronic neck pain who underwent elective total shoulder arthroplasty in 10/18/2021.    Partial nontraumatic tear of rotator cuff, glenohumeral arthritis s/p reverse total shoulder arthroplasty (left), 10/18/2021  Surgery completed by Dr. Oquendo, under general anesthesia.  No surgical complications noted, EBL 75 cc.  --Defer surgical needs to primary service    Chronic recurrent migraines, frequent  Headache onset 0200, accompanied by nausea, dizziness, photophobia which is usual of her symptoms when a migraine starts. Per her , she has frequent migraines (requiring abortive meds about 1 time a week), and her current post operative presentation is not atypical for her post operatively. She has had PONV in the past (has scop patch on), and has a history of being \"highly sensitive\" to anesthesia side effects. She typically takes her maxalt early on to prevent a severe migraine (such as this one), but presumably slept through it while taking opioids post operatively. Waking with a migraine is her typical pattern. No focal neurological deficits on exam.  --Maxalt 10 mg PO x 1  --zofran/compazine IV for nausea/vomiting  --oxygen blow by - 10L oxymask x 10 min (no more than 10 min)  --mag sulfate 1g x 1  --as soon as migraine subsides, patient needs to start ambulating and participate in therapy as planned  --hold discharge until patient is eating/drinking, able to ambulate safely  --minimize narcotics due to nausea side effect  --neuro checks every 4 hours    Addendum 1605  Migraine grossly resolved, but remains " "mildly unsteady on her feet with some residual dizziness. She states this is typical of her post migraine state. She is still receiving IVF, on 1L NC and has yet to see therapy due to the delay from her migraine.   --would hold discharge tonight, complete therapies, wean from fluids, oxygen and discharge in AM    Abdominal Bloating  Notes abdominal bloating, has not yet passed gas. No tenderness on palpation, soft, mild distention. .  --ambulation ASAP once migraine improves     Atypical Chest Pain, transient, self limiting  Hx of Palpitations  Hx of Aortic Insufficiency   Chest pain, sudden onset, resolves without treatment, not exertional in nature. Typically sharp and lasts <20 min. Worked up by Cardiology 06/2021, echo at that time shows normal LVEF, mild-mod tricuspid insufficiency. She wore a holter monitor x 25 days showing \"isolated PAC PVC or short run of ectopic   atrial tachycardia on 3 of the 10 recordings.  The other 7 recordings   demonstrated no ectopy or other pathologic rhythm disturbance\" (essentially normal, documented as poor correlation between patient's rhythm and symptoms).      Other chronic comorbidities are well controlled  --Dyslipidemia  --Chronic neck pain  --Fibromyalgia   --SLE  --IBS versus spastic colon     The patient's care was discussed with the Attending Physician, Dr. Nikita Ward, Bedside Nurse, Patient and Patient's Family.    ANGELICA Ryan [facility]  Securely message with the Vocera Web Console (learn more here)  Text page via [facility] Paging/Directory      Clinically Significant Risk Factors Present on Admission                   ______________________________________________________________________    Chief Complaint   Elective reverse total shoulder, left     History is obtained from the patient    History of Present Illness   Radha Bull is a 71 year old female who  has a past medical history of dyslipidemia, depression, recurrent " "migraines, fibromyalgia, SLE, IBS vs spastic colon, mild aortic insufficiency, chronic neck pain who underwent elective total shoulder arthroplasty in 10/18/2021.    Patient was recovering pathway postoperatively, when she developed an acute migraine at approximately 2 AM on postop day #1.HA was accompanied by nausea, dizziness, photophobia which is usual of her symptoms when a migraine starts. Per her , she has frequent migraines (requiring abortive meds about 1 time a week), and her current post operative presentation is not atypical for her post operatively. She has had PONV in the past (has scop patch on), and has a history of being \"highly sensitive\" to anesthesia side effects. She typically takes her maxalt early on to prevent a severe migraine (such as this one), but presumably slept through it while taking opioids post operatively. Waking with a migraine is her typical pattern. No focal neurological deficits on exam.    Patient does note self-limiting sharp chest pain lasting less than 20 minutes, she has been recently evaluated by cardiology 06/2021.  She notes nausea/vomiting, poor oral intake, ongoing abdominal bloating, has not yet passed gas, and has not been ambulating due to her migraine symptoms.  She denies any coughing, fever/chills, peripheral edema, urinary symptoms.    Review of Systems   The 10 point Review of Systems is negative other than noted in the HPI or here.     Past Medical History    I have reviewed this patient's medical history and updated it with pertinent information if needed.   Past Medical History:   Diagnosis Date     Aortic insufficiency      Arthritis      Chronic neck pain      Colon polyps      Depressive disorder      Fibromyalgia      HLD (hyperlipidemia)      Insomnia      Migraine      Osteopenia      PONV (postoperative nausea and vomiting)      Rectal prolapse      SLE (systemic lupus erythematosus related syndrome) (H)      Spastic colon     possible IBS "       Past Surgical History   I have reviewed this patient's surgical history and updated it with pertinent information if needed.  Past Surgical History:   Procedure Laterality Date     ABDOMEN SURGERY      lap x 2 for infertility     APPENDECTOMY       AS REPAIR OF RECTUM,PROLAPSE MUCOSA       COLONOSCOPY       COLONOSCOPY N/A 8/24/2016    Procedure: COMBINED COLONOSCOPY, SINGLE OR MULTIPLE BIOPSY/POLYPECTOMY BY BIOPSY;  Surgeon: Madisyn Rodrigues MD;  Location:  GI     ENT SURGERY      removal of mass in lymph node of neck     Neck mass removed      benign     oral grafting       ORTHOPEDIC SURGERY Left     trigger finger     RECTAL PROLAPSE REPAIR       TONSILLECTOMY & ADENOIDECTOMY         Social History   I have reviewed this patient's social history and updated it with pertinent information if needed.  Social History     Tobacco Use     Smoking status: Never Smoker     Smokeless tobacco: Never Used   Substance Use Topics     Alcohol use: Yes     Comment: 4 glasses of wine a week     Drug use: No       Family History   I have reviewed this patient's family history and updated it with pertinent information if needed.  Family History   Problem Relation Age of Onset     Cancer Father      Multiple Sclerosis Sister        Medications   I have reviewed this patient's current medications    Allergies   Allergies   Allergen Reactions     Droperidol Other (See Comments)     Lock jaw     Pcn [Penicillins]      Sulfa Drugs Nausea and Vomiting       Physical Exam   Vital Signs: Temp: 97.6  F (36.4  C) Temp src: Temporal BP: (!) 142/77 Pulse: 74   Resp: 17 SpO2: 96 % O2 Device: None (Room air) Oxygen Delivery: 2 LPM  Weight: 124 lbs 6.4 oz    Constitutional: fatigued, alert, cooperative and appears uncomfortable  Eyes: pupils equal, round and reactive to light and extra-ocular muscles intact  Respiratory: No increased work of breathing, good air exchange, clear to auscultation bilaterally, no crackles or  wheezing  Cardiovascular: regular rate and rhythm, normal S1 and S2, no murmur noted and no edema  GI: soft, non tender, mild distention   Skin: warm and dry, incision is covered, dressing CDI  Neurologic: Awake, alert, oriented to name, place and time.  Cranial nerves II-XII are grossly intact.  Motor is 5 out of 5 bilaterally.  Cerebellar finger to nose, heel to shin intact.  Sensory is intact.   Neuropsychiatric: General: calm and poor eye contact  Level of consciousness: alert / normal      Data   Results for orders placed or performed during the hospital encounter of 10/18/21 (from the past 24 hour(s))   XR Shoulder Left Port G/E 2 Views - Grashey/Axillary    Narrative    XR PORTABLE LEFT SHOULDER TWO OR MORE VIEWS   10/18/2021 4:20 PM     HISTORY: Status post surgery    COMPARISON: None.      Impression    IMPRESSION: Reverse left shoulder arthroplasty in place without  evidence of complication.    ODELL LEON MD         SYSTEM ID:  [id number]   Hemoglobin   Result Value Ref Range    Hemoglobin 11.2 (L) 11.7 - 15.7 g/dL   Glucose by meter   Result Value Ref Range    GLUCOSE BY METER POCT 124 (H) 70 - 99 mg/dL

## 2021-10-20 ENCOUNTER — APPOINTMENT (OUTPATIENT)
Dept: OCCUPATIONAL THERAPY | Facility: CLINIC | Age: 71
DRG: 483 | End: 2021-10-20
Attending: STUDENT IN AN ORGANIZED HEALTH CARE EDUCATION/TRAINING PROGRAM
Payer: MEDICARE

## 2021-10-20 ENCOUNTER — APPOINTMENT (OUTPATIENT)
Dept: OCCUPATIONAL THERAPY | Facility: CLINIC | Age: 71
DRG: 483 | End: 2021-10-20
Attending: ORTHOPAEDIC SURGERY
Payer: MEDICARE

## 2021-10-20 VITALS
WEIGHT: 124.4 LBS | TEMPERATURE: 99.5 F | DIASTOLIC BLOOD PRESSURE: 61 MMHG | SYSTOLIC BLOOD PRESSURE: 111 MMHG | HEIGHT: 61 IN | BODY MASS INDEX: 23.49 KG/M2 | RESPIRATION RATE: 16 BRPM | OXYGEN SATURATION: 91 % | HEART RATE: 78 BPM

## 2021-10-20 LAB
GLUCOSE BLD-MCNC: 113 MG/DL (ref 70–99)
GLUCOSE BLDC GLUCOMTR-MCNC: 115 MG/DL (ref 70–99)
HGB BLD-MCNC: 10.6 G/DL (ref 11.7–15.7)

## 2021-10-20 PROCEDURE — 97165 OT EVAL LOW COMPLEX 30 MIN: CPT | Mod: GO | Performed by: OCCUPATIONAL THERAPIST

## 2021-10-20 PROCEDURE — 97535 SELF CARE MNGMENT TRAINING: CPT | Mod: GO | Performed by: OCCUPATIONAL THERAPIST

## 2021-10-20 PROCEDURE — 36415 COLL VENOUS BLD VENIPUNCTURE: CPT | Performed by: STUDENT IN AN ORGANIZED HEALTH CARE EDUCATION/TRAINING PROGRAM

## 2021-10-20 PROCEDURE — 82947 ASSAY GLUCOSE BLOOD QUANT: CPT | Performed by: ORTHOPAEDIC SURGERY

## 2021-10-20 PROCEDURE — 250N000013 HC RX MED GY IP 250 OP 250 PS 637: Performed by: NURSE PRACTITIONER

## 2021-10-20 PROCEDURE — 97530 THERAPEUTIC ACTIVITIES: CPT | Mod: GO | Performed by: OCCUPATIONAL THERAPIST

## 2021-10-20 PROCEDURE — 250N000013 HC RX MED GY IP 250 OP 250 PS 637: Performed by: PHYSICIAN ASSISTANT

## 2021-10-20 PROCEDURE — 85018 HEMOGLOBIN: CPT | Performed by: STUDENT IN AN ORGANIZED HEALTH CARE EDUCATION/TRAINING PROGRAM

## 2021-10-20 PROCEDURE — 99232 SBSQ HOSP IP/OBS MODERATE 35: CPT | Performed by: INTERNAL MEDICINE

## 2021-10-20 PROCEDURE — 250N000011 HC RX IP 250 OP 636: Performed by: STUDENT IN AN ORGANIZED HEALTH CARE EDUCATION/TRAINING PROGRAM

## 2021-10-20 PROCEDURE — 250N000013 HC RX MED GY IP 250 OP 250 PS 637: Performed by: STUDENT IN AN ORGANIZED HEALTH CARE EDUCATION/TRAINING PROGRAM

## 2021-10-20 PROCEDURE — 97110 THERAPEUTIC EXERCISES: CPT | Mod: GO | Performed by: OCCUPATIONAL THERAPIST

## 2021-10-20 RX ORDER — ONDANSETRON 4 MG/1
4 TABLET, ORALLY DISINTEGRATING ORAL EVERY 6 HOURS PRN
Qty: 30 TABLET | Refills: 0 | Status: SHIPPED | OUTPATIENT
Start: 2021-10-20

## 2021-10-20 RX ADMIN — ESCITALOPRAM 15 MG: 5 TABLET, FILM COATED ORAL at 09:40

## 2021-10-20 RX ADMIN — ONDANSETRON 4 MG: 4 TABLET, ORALLY DISINTEGRATING ORAL at 04:06

## 2021-10-20 RX ADMIN — RIZATRIPTAN BENZOATE 5 MG: 5 TABLET ORAL at 03:39

## 2021-10-20 RX ADMIN — ACETAMINOPHEN 975 MG: 325 TABLET, FILM COATED ORAL at 09:54

## 2021-10-20 RX ADMIN — BUPROPION HYDROCHLORIDE 300 MG: 150 TABLET, FILM COATED, EXTENDED RELEASE ORAL at 09:41

## 2021-10-20 RX ADMIN — ONDANSETRON 4 MG: 4 TABLET, ORALLY DISINTEGRATING ORAL at 14:50

## 2021-10-20 RX ADMIN — OXYCODONE HYDROCHLORIDE 10 MG: 5 TABLET ORAL at 11:08

## 2021-10-20 RX ADMIN — ASPIRIN 325 MG: 81 TABLET, COATED ORAL at 09:41

## 2021-10-20 RX ADMIN — OXYCODONE HYDROCHLORIDE 10 MG: 5 TABLET ORAL at 00:09

## 2021-10-20 RX ADMIN — ACETAMINOPHEN 975 MG: 325 TABLET, FILM COATED ORAL at 00:08

## 2021-10-20 ASSESSMENT — ACTIVITIES OF DAILY LIVING (ADL)
ADLS_ACUITY_SCORE: 6
PREVIOUS_RESPONSIBILITIES: HOUSEKEEPING;MEDICATION MANAGEMENT;DRIVING
ADLS_ACUITY_SCORE: 6
ADLS_ACUITY_SCORE: 6

## 2021-10-20 NOTE — PLAN OF CARE
Pt A/Ox4. Up SBA w/ gait belt. Sling in place. CNS intact. Pt reported headache and nausea last night - gave pt PRN for migraine and PRN zofran. Pt weaned to 1 LPM O2 - drops when sleeping. Voiding, passing flatus. Dressing CDI.

## 2021-10-20 NOTE — PLAN OF CARE
Occupational Therapy Discharge Summary    Reason for therapy discharge:    Discharged to home.    Progress towards therapy goal(s). See goals on Care Plan in [facility] electronic health record for goal details.  Goals met    Therapy recommendation(s):    Continue home exercise program.

## 2021-10-20 NOTE — PLAN OF CARE
"Patient up with SBA. Voiding in BR. Patient sat up to use the BR ~1700 and had emesis. Ambulated to bathroom and upon laying down stated she felt \"slightly dizzy\", /67. Patients spouse stated concerns regarding discharging home and asking if she should stay another night, patients stated \"I can still discharge\". Writer called to update Rupal Alves, who also updated Dr. Oquendo. Per MD they would like patient to still discharge this evening. Writer spoke with patient and patients spouse and both stated agreement with plan. Discharge AVS reviewed with patient and spouse, all questions answered at this time. Patient discharged home with belongings.   "

## 2021-10-20 NOTE — PROGRESS NOTES
[facility]    Medicine Progress Note - Hospitalist Service       Date of Admission:  10/18/2021    Assessment & Plan   Radah Bull is a 71 year old female with hx of SLE not on immunosuppressive tx, fibromyalgia with chronic pain, and recurrent migraines who was admitted to the Orthopedic Surgery service on 10/18/2021 for left reverse total shoulder arthroplasty. Hospitalist service was consulted for post-op migraine    1. Partial nontraumatic tear of rotator cuff, glenohumeral arthritis s/p left reverse total shoulder arthroplasty on 10/18/2021  2. Acute migraine  3. History of palpitations  4. Dyslipidemia  5. SLE  6. Fibromyalgia   7. Chronic neck pain  8. IBS vs spastic colon  9. Major recurrent depressive disorder with anxiety     - Migraine improving  - OK to discharge later today if patient is feeling better. Zofran ODT prn ordered at discharge  - OK to resume home medications at discharge     Diet: Advance Diet as Tolerated: Regular Diet Adult  Discharge Instruction - Regular Diet Adult    DVT Prophylaxis: Aspirin 325 mg as per Ortho  Sheridan Catheter: Not present  Code Status: Full Code      Disposition: Expected discharge home later today if ambulating and tolerating PO as per Ortho    The patient's care was discussed with the Patient and Patient's Family.    Airam Childress MD  Hospitalist Service  [facility]  Contact information available via [facility] Paging/Directory    ______________________________________________________________________    Interval History   Nausea overnight, improved with Zofran ODT. Denies headache or nausea this morning. Discussed potential discharge later today if she is feeling better - agreeable, but patient's  expressed some concern over discharging too soon.    Data reviewed today: I reviewed all medications, new labs and imaging results over the last 24 hours. I personally reviewed no images or EKG's  today.    Physical Exam   Vital Signs: Temp: 98.9  F (37.2  C) Temp src: Oral BP: 100/60 Pulse: 79   Resp: 16 SpO2: 93 % O2 Device: None (Room air) Oxygen Delivery: 2 LPM  Weight: 124 lbs 6.4 oz  Constitutional: Appears comfortable, NAD  HEENT: Sclera white, MMM  Respiratory: Breathing non-labored. Lungs CTAB - no wheezes, crackles, or rhonchi  Cardiovascular: Heart RRR, no m/r/g. No pedal edema  GI: +BS, abd soft/NT  Skin/Integument: No rash  Musculoskeletal: Normal muscle bulk and tone  Neuro: Alert and appropriate, LEE  Psych: Calm and cooperative    Data   Recent Labs   Lab 10/20/21  0811 10/20/21  0559 10/19/21  0756 10/19/21  0525 10/14/21  1629   HGB 10.6*  --   --  11.2*  --    NA  --   --   --   --  141   POTASSIUM  --   --   --   --  4.3   CHLORIDE  --   --   --   --  104   CO2  --   --   --   --  27   BUN  --   --   --   --  11   CR  --   --   --   --  0.79   ANIONGAP  --   --   --   --  10   CHINEDU  --   --   --   --  9.7   * 115* 124*  --  85         No results found for this or any previous visit (from the past 24 hour(s)).    Medications     lactated ringers Stopped (10/19/21 2309)       acetaminophen  975 mg Oral Q8H     aspirin  325 mg Oral Daily     buPROPion  300 mg Oral QAM     escitalopram  15 mg Oral Daily     eszopiclone  3 mg Oral At Bedtime     Lidocaine  1 patch Transdermal Q24H     lidocaine   Transdermal Q8H     senna-docusate  1 tablet Oral BID     simvastatin  40 mg Oral Every Other Day     sodium chloride (PF)  3 mL Intracatheter Q8H

## 2021-10-20 NOTE — PROGRESS NOTES
10/20/21 1000   Quick Adds   Type of Visit Initial Occupational Therapy Evaluation   Living Environment   People in home spouse   Current Living Arrangements house   Home Accessibility stairs to enter home   Number of Stairs, Main Entrance 2  (no rail)   Transportation Anticipated family or friend will provide  (driving PTA)   Living Environment Comments Pt. reports able to stay on main level once in the home. Pt. has a RTS, support on R side in master bath, no support in other bahroom;Pt. has a walk-in shower in master bath, tub/shower combo. w/ grab bar + suction  cup grab bars in other bathroom. Pt. no using AD PTA.   Self-Care   Usual Activity Tolerance moderate   Current Activity Tolerance moderate   Regular Exercise No   Equipment Currently Used at Home grab bar, tub/shower   Activity/Exercise/Self-Care Comment Pt. reports she is indep. w/ basic ADL's, has grabb ars in tub;Pt.'s spouse does majority of household tasks, pt. no longer cooks.    Instrumental Activities of Daily Living (IADL)   Previous Responsibilities housekeeping;medication management;driving   Disability/Function   Hearing Difficulty or Deaf no   Wear Glasses or Blind yes   Vision Management glasses   Difficulty Eating/Swallowing no   Walking or Climbing Stairs Difficulty no   Dressing/Bathing Difficulty no  (has grab bars in tub;uses shower to wash hair)   Toileting issues no   Doing Errands Independently Difficulty (such as shopping) no   Fall history within last six months yes   Number of times patient has fallen within last six months 3   Change in Functional Status Since Onset of Current Illness/Injury yes   General Information   Onset of Illness/Injury or Date of Surgery 10/18/21   Referring Physician  Rupal Jerez PA-C    Additional Occupational Profile Info/Pertinent History of Current Problem OT:Radha Bull is a 71 year old female admitted on 10/18/2021. She has a past medical history of dyslipidemia, depression, recurrent  migraines, fibromyalgia, SLE, IBS vs spastic colon, mild aortic insufficiency, chronic neck pain who underwent elective total shoulder arthroplasty in 10/18/2021. Pt. is POD#2:LEFT REVERSE SHOULDER ARTHROPLASTY WITH NO CUSTOM GUIDE    Performance Patterns (Routines, Roles, Habits) Pt. overall indep. w/ basic ADL's, spouse completes most IADL's   Existing Precautions/Restrictions fall;shoulder   Left Upper Extremity (Weight-bearing Status) non weight-bearing (NWB)   General Observations and Info Pt. lying supine, minimal pain L shoulder, in sling;spouse present;pt. feeling much better than yesterday--had migraine/nauseated.   Cognitive Status Examination   Orientation Status orientation to person, place and time   Cognitive Status Comments intact per  observation/conversation   Visual Perception   Impact of Vision Impairment on Function (Vision) pt. wears glasses, start of macular degeneration per pt.   Sensory   Sensory Comments no numbness/tigling reported   Pain Assessment   Patient Currently in Pain Yes, see Vital Sign flowsheet  (L shoulder--minimal)   Posture   Posture forward head position;protracted shoulders   Range of Motion Comprehensive   Comment, General Range of Motion RUE:WNL LUE:elbow through hand WNL/WFL   Strength Comprehensive (MMT)   Comment, General Manual Muscle Testing (MMT) Assessment RUE:WNL LUE:WFL elbow through hand   Coordination   Upper Extremity Coordination No deficits were identified   Bed Mobility   Comment (Bed Mobility) SBA supine-sit-supine   Transfers   Transfer Comments sit-stand=SBA   Balance   Balance Comments slightly unsteady/weak from being in bed   Upper Body Dressing Assessment   Spencer Level (Upper Body Dressing) maximum assist (25% patient effort)   Grooming Assessment   Spencer Level (Grooming) set up;supervision  (SBA)   Toileting   Spencer Level (Toileting) supervision;verbal cues  (SBA)   Clinical Impression   Criteria for Skilled Therapeutic  Interventions Met (OT) yes   OT Diagnosis Decline in ADL performance   OT Problem List-Impairments impacting ADL problems related to;balance;mobility;range of motion (ROM);strength;pain;post-surgical precautions   Assessment of Occupational Performance 3-5 Performance Deficits   Identified Performance Deficits dressing, toileting, bathing, grooming, fx. mobility, IADL's   Planned Therapy Interventions (OT) ADL retraining;balance training;ROM;strengthening;home program guidelines;progressive activity/exercise   Intervention Comments slightly unsteady due to weakness--will monitor   Clinical Decision Making Complexity (OT) low complexity   Therapy Frequency (OT) 2x/day   Predicted Duration of Therapy 1-2 sessions   Anticipated Equipment Needs Upon Discharge (OT) bathing equipment;dressing equipment;reacher;shower chair;other (see comments)   Risk & Benefits of therapy have been explained evaluation/treatment results reviewed;care plan/treatment goals reviewed;risks/benefits reviewed;current/potential barriers reviewed;participants voiced agreement with care plan;participants included;patient;spouse/significant other   OT Discharge Planning    OT Discharge Recommendation (DC Rec) home;Home with assist  (OP PT/herapy per MD/ortho. protocol)   OT Rationale for DC Rec Overall,pt. moving well, slightly unsteady w/ mobility due to weakness;Anticipate pt. will cont. to improve in order to safely discharge home w/ assist from spouse w/ I/ADL's as needed. Will plan for additional OT session this afternoon to address stair training, dressing tasks.   OT Brief overview of current status  SBA bed mobility,sit-stand transfers, SBA-CGA room mobility, SBA toileting/transfer, SBA standing tasks; max. A w/ slling mgmt.;CBA-CGA for hallway mobility, unsteady at times;able to complete UE HEP without difficulty.    Total Evaluation Time (Minutes)   Total Evaluation Time (Minutes) 12

## 2021-10-20 NOTE — PROGRESS NOTES
"ORTHOPEDIC UPPER EXTREMITY PROGRESS NOTE    POD# 2  Patient is a 71 year old female who underwent Procedure(s):  LEFT REVERSE SHOULDER ARTHROPLASTY WITH NO CUSTOM GUIDE on 10/18/2021. Pain is well controlled.  She is feeling much better than yesterday.  Her migraine did return last night and she was nauseous but reports she is feeling much better now and was able to get some sleep.  Has  for support when she goes home.    Vitals:   Blood pressure 100/60, pulse 79, temperature 98.9  F (37.2  C), temperature source Oral, resp. rate 16, height 1.537 m (5' 0.5\"), weight 56.4 kg (124 lb 6.4 oz), SpO2 93 %.  Temp (24hrs), Av  F (36.7  C), Min:97.6  F (36.4  C), Max:98.3  F (36.8  C)      EXAM   The patient is awake and alert  Sensation is intact.  Digital Flexion/Extension maintained.   Brisk cap refill.   The incision is covered with prineo dressing, CDI    Labs:   Recent Labs   Lab Test 10/20/21  0811 10/19/21  0525   HGB 10.6* 11.2*       ASSESSMENT  S/p L rev TSA   Chronic recurrent migraines with acute migraine - resolved  PLAN  1. Continue with shoulder immobilizer.  Okay to remove for ADLs.  Okay for active range of motion for elbow, wrist and digits.  2. Weight Bearing NWB (Non weight bearing).  3. Wound Care leave undisturbed. Prineo dressing to be removed 3 weeks postoperatively.  4. Discharge anticipated date today pending continued resolution of migraine and clearance by medicine Home with No Skilled Service  5. Cont Pain Control: oxycodone and tylenol  6. DVT prophylaxis: aspirin      Cinthya Yen PA-C  [facility]      "

## 2021-10-26 NOTE — DISCHARGE SUMMARY
HOSPITAL DISCHARGE SUMMARY    Patient Name: Radha Bull  YOB: 1950  Age: 71 year old  Medical Record Number: [id number]  Primary Physician: Jennifer Kuhn  Phone: None  Admission Date: 10/18/2021   Discharge Date: 10/20/2021    She will be discharged from [facility] to discharge destination: Home with No Skilled Service.    PRINCIPAL DISCHARGE DIAGNOSIS: left shoulder rotator cuff tear arthropathy    <principal problem not specified>  Patient Active Problem List    Diagnosis Date Noted     Postoperative state 10/18/2021     Priority: Medium     ACP (advance care planning) 11/09/2016     Priority: Medium     Advance Care Planning 11/9/2016: Receipt of ACP document:  Received: Health Care Directive which was witnessed or notarized on 11-7-11.  Document previously scanned on 8-30-16.  Validation form completed and sent to be scanned.  Code Status needs to be updated to reflect choices in most recent ACP document.  Confirmed/documented designated decision maker(s).  Added by Anika Khan RN Advance Care Planning Liaison with [employer]              PROCEDURES PERFORMED DURING HOSPITALIZATION: left Reverse Total Shoulder Arthroplasty    BRIEF HOSPITAL COURSE: This is a pleasant 71 year old female who has had persistent complaints of pain that has failed non-operative management. Preoperative imaging revealed irreparable rotator cuff damage in the left shoulder.  The risks, benefits and possible complications of the above procedure were discussed with the patient. Patient elected to proceed to improve their lifestyle. Informed consent was obtained. For further details, please refer to the H&P in the chart.    The patient was admitted on 10/18/2021 and underwent the above-mentioned procedure. Patient tolerated this procedure well. Postoperatively, patient was seen in consultation by the internal medicine hospitalist service. Throughout the hospitalization, wound remained clean. The  "patient was started and advanced in a diet. CMS remained intact. Patient was seen and evaluated by occupational therapy. Hemoglobin was stable prior to discharge.    COMPLICATIONS IN HOSPITAL: complications: None    PERTINENT FINDINGS/RESULTS AT DISCHARGE:   Blood pressure 111/61, pulse 78, temperature 99.5  F (37.5  C), temperature source Oral, resp. rate 16, height 1.537 m (5' 0.5\"), weight 56.4 kg (124 lb 6.4 oz), SpO2 91 %.    Latest Laboratory Results:  Chem:  Recent Labs   Lab Test 10/14/21  1629 04/22/21  1701   POTASSIUM 4.3 4.1     WBC/Hgb:  Recent Labs   Lab Test 10/20/21  0811 10/19/21  0525   HGB 10.6* 11.2*     INR:  No results for input(s): INR in the last 79933 hours.  No components found for: [id number]    IMPORTANT PENDING TEST RESULTS: complications: None    CONDITION AT DISCHARGE:   discharge condition: Stabilized    DISCHARGE ORDERS      Review of your medicines      START taking      Dose / Directions   acetaminophen 325 MG tablet  Commonly known as: TYLENOL  Used for: Postoperative state  Replaces: Tylenol 8 Hour Arthritis Pain 650 MG CR tablet      Dose: 650 mg  Take 2 tablets (650 mg) by mouth every 4 hours as needed for other (For optimal non-opioid multimodal pain management to improve pain control.)  Quantity: 100 tablet  Refills: 0     aspirin 325 MG EC tablet  Commonly known as: ASA  Used for: ACP (advance care planning)      Dose: 325 mg  Take 1 tablet (325 mg) by mouth daily  Quantity: 30 tablet  Refills: 0     ondansetron 4 MG ODT tab  Commonly known as: ZOFRAN-ODT  Used for: Postoperative state      Dose: 4 mg  Take 1 tablet (4 mg) by mouth every 6 hours as needed for nausea or vomiting  Quantity: 30 tablet  Refills: 0     oxyCODONE 5 MG tablet  Commonly known as: ROXICODONE  Used for: Postoperative state      Dose: 5 mg  Take 1 tablet (5 mg) by mouth every 6 hours as needed for severe pain  Quantity: 26 tablet  Refills: 0     SENNA-docusate sodium 8.6-50 MG tablet  Commonly known " as: SENNA S  Used for: Postoperative state      Take 1-2 tablets by mouth twice daily while taking oral narcotics to treat and prevent constipation  Quantity: 30 tablet  Refills: 0        CONTINUE these medicines which have NOT CHANGED      Dose / Directions   BIOTIN PO      Dose: 1 chew tab  Take 1 chew tab by mouth 2 times daily  Refills: 0     buPROPion 300 MG 24 hr tablet  Commonly known as: WELLBUTRIN XL      Dose: 300 mg  Take 300 mg by mouth every morning  Refills: 0     Diclofenac Sodium 3 % Gel      Externally apply topically daily as needed  Refills: 0     escitalopram 10 MG tablet  Commonly known as: LEXAPRO      Dose: 15 mg  Take 15 mg by mouth daily (1.5 X 10 mg)  Refills: 0     eszopiclone 3 MG tablet  Commonly known as: LUNESTA      Dose: 3 mg  Take 3 mg by mouth At Bedtime  Refills: 0     Lidocaine 4 % Patch  Commonly known as: LIDOCARE      Dose: 1 patch  Place 1 patch onto the skin every 24 hours To prevent lidocaine toxicity, patient should be patch free for 12 hrs daily.  Refills: 0     loperamide 2 MG capsule  Commonly known as: IMODIUM      Dose: 2 mg  Take 2 mg by mouth once as needed for diarrhea  Refills: 0     melatonin 3-10 MG Tabs      Dose: 1 tablet  Take 1 tablet by mouth nightly as needed  Refills: 0     pramipexole 0.125 MG tablet  Commonly known as: MIRAPEX      Dose: 0.125 mg  Take 0.125 mg by mouth daily as needed  Refills: 0     PROBIOTIC DAILY PO      Dose: 1 capsule  Take 1 capsule by mouth 2 times daily  Refills: 0     propranolol 10 MG tablet  Commonly known as: INDERAL      Dose: 10 mg  Take 10 mg by mouth daily as needed  Refills: 0     rizatriptan 5 MG tablet  Commonly known as: MAXALT      Dose: 5 mg  Take 5 mg by mouth at onset of headache for migraine  Refills: 0     simvastatin 40 MG tablet  Commonly known as: ZOCOR      Dose: 40 mg  Take 40 mg by mouth every other day  Refills: 0     triamcinolone 0.1 % external ointment  Commonly known as: KENALOG      Apply topically  daily as needed for irritation  Refills: 0     vitamin D3 50 mcg (2000 units) tablet  Commonly known as: CHOLECALCIFEROL      Dose: 1 tablet  Take 1 tablet by mouth daily  Refills: 0        STOP taking    diclofenac 75 MG EC tablet  Commonly known as: VOLTAREN        Tylenol 8 Hour Arthritis Pain 650 MG CR tablet  Generic drug: acetaminophen  Replaced by: acetaminophen 325 MG tablet              Where to get your medicines      These medications were sent to [facility] [place], [place] - [address] [address]  [address] [address], [facility] [address]    Phone: [phone]     acetaminophen 325 MG tablet    aspirin 325 MG EC tablet    ondansetron 4 MG ODT tab    oxyCODONE 5 MG tablet    SENNA-docusate sodium 8.6-50 MG tablet         [unfilled]  After Care Instructions     Activity - Exercises to prevent blood clots      Unless otherwise directed by your Surgeon team, perform the following exercises at least three times per day for the first four weeks after surgery to prevent blood clots in your legs: 1) Point and flex your feet (Ankle Pumps), 2) Move your ankle around in big circles, 3) Wiggle your toes, 4) Walk, even for short distances, several times a day, will help decrease the risk of blood clots.         Comfort and Pain Management - Cold therapy      Ice can be used to control swelling and discomfort after surgery. Place a thin towel over your operative site and apply the ice pack overtop. Leave ice pack in place for 20 minutes, then remove for 20 minutes. Repeat this 20 minutes on/20 minutes off routine as often as tolerated.         Comfort and Pain Management - Pain after Surgery      Pain after surgery is normal and expected.  You will have some amount of pain for several weeks after surgery.  Your pain will improve with time.  There are several things you can do to help reduce your pain including: rest, ice, elevation, and using pain medications as needed. Contact your Surgeon  Team if you have pain that persists or worsens after surgery despite rest, ice, elevation, and taking your medication(s) as prescribed. Contact your Surgeon Team if you have new numbness, tingling, or weakness in your operative extremity.         Comfort and Pain Management - Swelling after Surgery      Swelling and/or bruising of the surgical extremity is common and may persist for several months after surgery. In addition to frequent icing and elevation, gentle compressive support with an ACE wrap or tubigrip may help with swelling. Apply compression regularly, removing at least twice daily to perform skin checks. Contact your Surgeon Team if your swelling increases and is NOT associated with an increase in your activity level, or if your swelling increases and is associated with redness and pain.         Comfort and Pain Management - UPPER extremity Elevation      Swelling is expected for several months after surgery. This type of swelling is usually associated with gravity and activity, and can be improved with elevation.   The best way to do this is to get your hand above your heart by sitting down, resting your elbow on a pillow or arm rest, with your hand in the air. Perform this elevation as often as possible especially for the first two weeks after surgery         Discharge Instruction - Breathing exercises      Perform breathing exercises using your Incentive Spirometer 10 times per hour while awake for 2 weeks.         Discharge Instruction - Regular Diet Adult      Return to your pre-surgery diet unless instructed otherwise         Discharge Instructions      Wear sling/immobilizer at all times, including sleep. May remove to dress and shower.         Dressing / Wound Care - NO Tub Bathing      Tub bathing, swimming, or any other activities that will cause your incision to be submerged in water should be avoided until allowed by your Surgeon.         Dressing / Wound Care - Wound      You have a clean  dressing on your surgical wound. Dressing change instructions as follows: Remove bulky dressing 24 hours after surgery. Do not remove exofin (mesh dressing) until 3 weeks post-op. You may shower 48 hours after surgery. Contact your Surgeon Team if you have increased redness, warmth around the surgical wound, and/or drainage from the surgical wound.         Dressing Wound Care - Shower with wound/dressing NOT covered      After 24 hours you may remove her bulky (ABD padding). Do not remove the exofin (mesh dressing) until 3 weeks post-op. You may shower on POD #2. You do not need to cover your mesh dressing in the shower, you may allow water and soap to run over top of the surgical dressing or incision. You may shower 2 days after surgery.  You are strictly prohibited from soaking or submerging the surgical wound underwater.         Elbow and Wrist range of motion      Active and passive elbow range of motion is permitted. Perform Fist pumps every hour while you are awake. Start day after surgery. May use hand, no greater than 1-2 lbs. Wear sling/immobilzer at all times, except to dress and bathe.         Medication Instructions - Acetaminophen (TYLENOL) Instructions      You were discharged with acetaminophen (TYLENOL) for pain management after surgery. Acetaminophen most effectively manages pain symptoms when it is taken on a schedule without missing doses (every four, six, or eight hours). Your Provider will prescribe a safe daily dose between 3000 - 4000 mg.  Do NOT exceed this daily dose. Most patients use acetaminophen for pain control for the first four weeks after surgery.  You can wean from this medication as your pain decreases.         Medication Instructions - NSAID Instructions      No NSAIDs for 1 week post-op.         Medication Instructions - Opioid Instructions (Greater than or equal to 65 years)      You were discharged with an opioid medication (hydromorphone, oxycodone, hydrocodone, or tramadol).  This medication should only be taken for breakthrough pain that is not controlled with acetaminophen (TYLENOL). If you rate your pain less than 3 you do not need this medication.  Pain rating 0-3:  You do not need this medication  Pain rating 4-6:  Take 1/2 tablet every 4-6 hours as needed  Pain rating 7-10:  Take 1 tablet every 4-6 hours as needed  Do not exceed 4 tablets per day         Medication Instructions - Opioids - Tapering Instructions      In the first three days following surgery, your symptoms may warrant use of the narcotic pain medication every four to six hours as prescribed. This is normal. As your pain symptoms improve, focus your efforts on decreasing (tapering) use of narcotic medications. The most successful tapering strategy is to first, decrease the number of tablets you take every 4-6 hours to the minimum prescribed. Then, increase the amount of time between doses.  For example:  First, taper to   or 1 tablet every 4-6 hours.  Then, taper to   or 1 tablet every 6-8 hours.  Then, taper to   or 1 tablet every 8-10 hours.  Then, taper to   or 1 tablet every 10-12 hours.  Then, taper to   or 1 tablet at bedtime.  The bedtime dose can help with comfort during sleep and is typically the last dose to be discontinued after surgery.         Medication instructions -  Anticoagulation - aspirin      Take the aspirin as prescribed for a total of four weeks after surgery.  This is given to help minimize your risk of blood clot.         NO weight bearing      No weight bearing on your operative extremity.         Return to Driving      Return to driving - Driving is NOT permitted until directed by your provider. Under no circumstance are you permitted to drive while using narcotic pain medications.         Symptoms - Constipation management      Constipation (hard, dry bowel movements) is expected after surgery due to the combination of being less active, the anesthetic, and the opioid pain medication.   "You can do the following to help reduce constipation:  ~  FLUIDS:  Drink clear liquids (water or Gatorade), or juice (apple/prune).  ~  DIET:  Eat a fiber rich diet.    ~  ACTIVITY:  Get up and move around several times a day.  Increase your activity as you are able.  MEDICATIONS:  Reduce the risk of constipation by starting medications before you are constipated.  You can take Miralax   (1 packet as directed) and/or a stool softener (Senokot 1-2 tablets 1-2 times a day).  If you already have constipation and these medications are not working, you can get magnesium citrate and use as directed.  If you continue to have constipation you can try an over the counter suppository or enema.  Call your Surgeon Team if it has been greater than 3 days since your last bowel movement.         Symptoms - Fever Management      A low grade fever can be expected after surgery.  Use acetaminophen (TYLENOL) as needed for fever management.  Contact your Surgeon Team if you have a fever greater than 101.5 F, chills, and/or night sweats.         Symptoms - Reduced Urine Output      Changes in the amount of fluids you drank before and after surgery may result in problems urinating.  It is important to stay well-hydrated after surgery and drink plenty of water. If it has been greater than 8 hours since you have urinated despite drinking plenty of water, call your Surgeon Team.         When to call - Contact Surgeon Team      You may experience symptoms that require follow-up before your scheduled appointment. Refer to the \"Stoplight Tool\" for instructions on when to contact your Surgeon Team if you are concerned about pain control, blood clots, constipation, or if you are unable to urinate.         When to call - Reach out to Urgent Care      If you are not able to reach your Surgeon Team and you need immediate care, go to the Orthopedic Walk-in Clinic or Urgent Care at your Surgeon's office.  Do NOT go to the Emergency Room unless you " have shortness of breath, chest pain, or other signs of a medical emergency.         When to call - Reasons to Call 911      Call 911 immediately if you experience sudden-onset chest pain, arm weakness/numbness, slurred speech, or shortness of breath                After Discharge Recommendations:  1. Resume pre-admission diet  2. DVT prophylaxis:  mg daily for one month.  3. Follow up: She should see Dr. Oquendo in clinic in 11-14 days.  4. Sutures will be removed at the follow-up appointment.  5. Weight Bearing NWB (No weight bearing) left upper extremity.  6. Sling for one month.    7. Additional followup: complications: None  8. Special instructions: complications: None    Total time spent for discharge on date of discharge: 25 minutes    Physician(s) in addition to primary physician who should receive a copy:  Primary Care Physician Jennifer Kuhn  [facility], Fax: [phone]    SHANTEL Weir...................  10/26/2021   6:41 AM

## 2021-11-07 ENCOUNTER — HEALTH MAINTENANCE LETTER (OUTPATIENT)
Age: 71
End: 2021-11-07

## 2022-09-15 ENCOUNTER — LAB REQUISITION (OUTPATIENT)
Dept: LAB | Facility: CLINIC | Age: 72
End: 2022-09-15
Payer: MEDICARE

## 2022-09-15 DIAGNOSIS — E55.9 VITAMIN D DEFICIENCY, UNSPECIFIED: ICD-10-CM

## 2022-09-15 DIAGNOSIS — R53.83 OTHER FATIGUE: ICD-10-CM

## 2022-09-15 DIAGNOSIS — E78.2 MIXED HYPERLIPIDEMIA: ICD-10-CM

## 2022-09-15 LAB
ALBUMIN SERPL BCG-MCNC: 4.4 G/DL (ref 3.5–5.2)
ALP SERPL-CCNC: 80 U/L (ref 35–104)
ALT SERPL W P-5'-P-CCNC: 20 U/L (ref 10–35)
ANION GAP SERPL CALCULATED.3IONS-SCNC: 9 MMOL/L (ref 7–15)
AST SERPL W P-5'-P-CCNC: 24 U/L (ref 10–35)
BILIRUB SERPL-MCNC: 0.4 MG/DL
BUN SERPL-MCNC: 12.3 MG/DL (ref 8–23)
CALCIUM SERPL-MCNC: 9.7 MG/DL (ref 8.8–10.2)
CHLORIDE SERPL-SCNC: 103 MMOL/L (ref 98–107)
CHOLEST SERPL-MCNC: 226 MG/DL
CREAT SERPL-MCNC: 0.84 MG/DL (ref 0.51–0.95)
DEPRECATED HCO3 PLAS-SCNC: 29 MMOL/L (ref 22–29)
GFR SERPL CREATININE-BSD FRML MDRD: 74 ML/MIN/1.73M2
GLUCOSE SERPL-MCNC: 86 MG/DL (ref 70–99)
HDLC SERPL-MCNC: 84 MG/DL
LDLC SERPL CALC-MCNC: 106 MG/DL
NONHDLC SERPL-MCNC: 142 MG/DL
POTASSIUM SERPL-SCNC: 4.3 MMOL/L (ref 3.4–5.3)
PROT SERPL-MCNC: 6.4 G/DL (ref 6.4–8.3)
SODIUM SERPL-SCNC: 141 MMOL/L (ref 136–145)
TRIGL SERPL-MCNC: 178 MG/DL
TSH SERPL DL<=0.005 MIU/L-ACNC: 1.73 UIU/ML (ref 0.3–4.2)

## 2022-09-15 PROCEDURE — 82607 VITAMIN B-12: CPT | Mod: ORL | Performed by: PHYSICIAN ASSISTANT

## 2022-09-15 PROCEDURE — 80061 LIPID PANEL: CPT | Mod: ORL | Performed by: PHYSICIAN ASSISTANT

## 2022-09-15 PROCEDURE — 84443 ASSAY THYROID STIM HORMONE: CPT | Mod: ORL | Performed by: PHYSICIAN ASSISTANT

## 2022-09-15 PROCEDURE — 82306 VITAMIN D 25 HYDROXY: CPT | Mod: ORL | Performed by: PHYSICIAN ASSISTANT

## 2022-09-15 PROCEDURE — 80053 COMPREHEN METABOLIC PANEL: CPT | Mod: ORL | Performed by: PHYSICIAN ASSISTANT

## 2022-09-16 LAB
DEPRECATED CALCIDIOL+CALCIFEROL SERPL-MC: 86 UG/L (ref 20–75)
VIT B12 SERPL-MCNC: 270 PG/ML (ref 232–1245)

## 2022-11-19 ENCOUNTER — HEALTH MAINTENANCE LETTER (OUTPATIENT)
Age: 72
End: 2022-11-19

## 2022-12-02 ENCOUNTER — LAB REQUISITION (OUTPATIENT)
Dept: LAB | Facility: CLINIC | Age: 72
End: 2022-12-02
Payer: MEDICARE

## 2022-12-02 DIAGNOSIS — R05.1 ACUTE COUGH: ICD-10-CM

## 2022-12-02 LAB — RSV AG SPEC QL: NEGATIVE

## 2022-12-02 PROCEDURE — 87807 RSV ASSAY W/OPTIC: CPT | Mod: ORL | Performed by: PHYSICIAN ASSISTANT

## 2022-12-02 PROCEDURE — U0005 INFEC AGEN DETEC AMPLI PROBE: HCPCS | Mod: ORL | Performed by: PHYSICIAN ASSISTANT

## 2022-12-03 LAB — SARS-COV-2 RNA RESP QL NAA+PROBE: NEGATIVE

## 2023-08-29 ENCOUNTER — LAB REQUISITION (OUTPATIENT)
Dept: LAB | Facility: CLINIC | Age: 73
End: 2023-08-29
Payer: MEDICARE

## 2023-08-29 DIAGNOSIS — E78.2 MIXED HYPERLIPIDEMIA: ICD-10-CM

## 2023-08-29 DIAGNOSIS — M85.89 OTHER SPECIFIED DISORDERS OF BONE DENSITY AND STRUCTURE, MULTIPLE SITES: ICD-10-CM

## 2023-08-29 LAB
ALBUMIN SERPL BCG-MCNC: 4.4 G/DL (ref 3.5–5.2)
ALP SERPL-CCNC: 100 U/L (ref 35–104)
ALT SERPL W P-5'-P-CCNC: 27 U/L (ref 0–50)
ANION GAP SERPL CALCULATED.3IONS-SCNC: 14 MMOL/L (ref 7–15)
AST SERPL W P-5'-P-CCNC: 29 U/L (ref 0–45)
BILIRUB SERPL-MCNC: 0.4 MG/DL
BUN SERPL-MCNC: 11.3 MG/DL (ref 8–23)
CALCIUM SERPL-MCNC: 9.3 MG/DL (ref 8.8–10.2)
CHLORIDE SERPL-SCNC: 104 MMOL/L (ref 98–107)
CHOLEST SERPL-MCNC: 196 MG/DL
CREAT SERPL-MCNC: 0.78 MG/DL (ref 0.51–0.95)
DEPRECATED HCO3 PLAS-SCNC: 25 MMOL/L (ref 22–29)
GFR SERPL CREATININE-BSD FRML MDRD: 80 ML/MIN/1.73M2
GLUCOSE SERPL-MCNC: 76 MG/DL (ref 70–99)
HDLC SERPL-MCNC: 77 MG/DL
LDLC SERPL CALC-MCNC: 84 MG/DL
NONHDLC SERPL-MCNC: 119 MG/DL
POTASSIUM SERPL-SCNC: 3.9 MMOL/L (ref 3.4–5.3)
PROT SERPL-MCNC: 6.4 G/DL (ref 6.4–8.3)
SODIUM SERPL-SCNC: 143 MMOL/L (ref 136–145)
TRIGL SERPL-MCNC: 175 MG/DL

## 2023-08-29 PROCEDURE — 80061 LIPID PANEL: CPT | Mod: ORL | Performed by: PHYSICIAN ASSISTANT

## 2023-08-29 PROCEDURE — 82306 VITAMIN D 25 HYDROXY: CPT | Mod: ORL | Performed by: PHYSICIAN ASSISTANT

## 2023-08-29 PROCEDURE — 80053 COMPREHEN METABOLIC PANEL: CPT | Mod: ORL | Performed by: PHYSICIAN ASSISTANT

## 2023-08-30 ENCOUNTER — TRANSCRIBE ORDERS (OUTPATIENT)
Dept: OTHER | Age: 73
End: 2023-08-30

## 2023-08-30 DIAGNOSIS — R29.6 FREQUENT FALLS: Primary | ICD-10-CM

## 2023-08-30 DIAGNOSIS — R29.6 FALLS FREQUENTLY: ICD-10-CM

## 2023-08-30 LAB — DEPRECATED CALCIDIOL+CALCIFEROL SERPL-MC: 68 UG/L (ref 20–75)

## 2023-09-13 ENCOUNTER — THERAPY VISIT (OUTPATIENT)
Dept: PHYSICAL THERAPY | Facility: CLINIC | Age: 73
End: 2023-09-13
Attending: PHYSICIAN ASSISTANT
Payer: MEDICARE

## 2023-09-13 DIAGNOSIS — R26.89 BALANCE PROBLEMS: Primary | ICD-10-CM

## 2023-09-13 DIAGNOSIS — R29.6 FREQUENT FALLS: ICD-10-CM

## 2023-09-13 PROCEDURE — 97110 THERAPEUTIC EXERCISES: CPT | Mod: GP

## 2023-09-13 PROCEDURE — 97161 PT EVAL LOW COMPLEX 20 MIN: CPT | Mod: GP

## 2023-09-13 PROCEDURE — 97112 NEUROMUSCULAR REEDUCATION: CPT | Mod: GP

## 2023-09-13 NOTE — PROGRESS NOTES
PHYSICAL THERAPY EVALUATION  Type of Visit: Evaluation    See electronic medical record for Abuse and Falls Screening details.    Subjective       Presenting condition or subjective complaint:   Pt is a 72 y/o F, reporting multiple falls this year. She feels they are primarily related to toe drag on the R foot (hx of arthritis in the joint; pain). She's fallen on the grass, at the cabin, and in their home - can always get herself back up.   Date of onset: 08/30/23 (Order date chosen. Pt reporting her balance has been an evolving problem for ~1yr.)    Relevant medical history:     Past Medical History:   Diagnosis Date    Aortic insufficiency     Arthritis     Chronic neck pain     Colon polyps     Depressive disorder     Fibromyalgia     HLD (hyperlipidemia)     Insomnia     Migraine     Osteopenia     PONV (postoperative nausea and vomiting)     Rectal prolapse     SLE (systemic lupus erythematosus related syndrome) (H)     Spastic colon     possible IBS     Dates & types of surgery:    Past Surgical History:   Procedure Laterality Date    ABDOMEN SURGERY      lap x 2 for infertility    APPENDECTOMY      AS REPAIR OF RECTUM,PROLAPSE MUCOSA      COLONOSCOPY      COLONOSCOPY N/A 8/24/2016    Procedure: COMBINED COLONOSCOPY, SINGLE OR MULTIPLE BIOPSY/POLYPECTOMY BY BIOPSY;  Surgeon: Madisyn Rodrigues MD;  Location:  GI    ENT SURGERY      removal of mass in lymph node of neck    Neck mass removed      benign    oral grafting      ORTHOPEDIC SURGERY Left     trigger finger    RECTAL PROLAPSE REPAIR      REVERSE ARTHROPLASTY SHOULDER Left 10/18/2021    Procedure: LEFT REVERSE SHOULDER ARTHROPLASTY WITH NO CUSTOM GUIDE;  Surgeon: Keyur Oquendo MD;  Location:  OR    TONSILLECTOMY & ADENOIDECTOMY       Prior diagnostic imaging/testing results:     No  Prior therapy history for the same diagnosis, illness or injury:     Yes, hx of PT for her shoulder 2' replacements. Nothing for the falls/balance.    Prior  Level of Function  Pt IND with all transfers, ambulation, and ADLs/IADLs at baseline.     Living Environment  Social support:   Pt lives with her .  Type of home:   House  Stairs to enter the home:       Yes, 1-2 stairs with no HR.   Stairs inside the home:       Yes, to the family room (don't go down too often - if so uses HR).   Help at home:   Pt's .  Equipment owned:   No  Employment:     No  Hobbies/Interests:   Gardening - has trouble getting up from the floor.    Patient goals for therapy:   To improve the balance.    Pain assessment: Pain present in the right foot 2' arthritis.     Objective   Cognitive Status Examination  PT with no cognitive concerns.    OBSERVATION: Pt presenting with her ; waited in the lobby.  PALPATION: N/A  RANGE OF MOTION: LE ROM WNL  UE ROM WNL  STRENGTH:  Pt with 4-/5 strength in the B LE. Likely contributing to poor balance and motor activation/control for stability.    BED MOBILITY: Independent    TRANSFERS: Independent; quick to fatigue with STS with no UE support.    WHEELCHAIR MOBILITY: N/A    GAIT:   Level of [place]: Independent  Assistive Device(s): None  Gait Deviations: Base of support decreased  Cinthya decreased  Gait Distance: 50ft during PT session.  Stairs: x4 with no HR and SUPV, x4 with HR and IND.    BALANCE:     SPECIAL TESTS  Functional Gait Assessment (FGA) TOTAL SCORE: (MAXIMUM SCORE 30): 20 /30    5 Times Sit-to-Stand (5TSTS)   0s; pt unable to complete 5 without UE support.     Single Leg Stance Right (sec) 10s   Single Leg Stance Left (sec) 8s   Modified CTSIB Conditions (sec) Cond 1: 30s  Cond 2: 30s  Cond 4: 30s  Cond 5 : 30s (inc sway) - controlled.       SENSATION: UE Sensation WNL, LE Sensation WNL    COORDINATION: Deferred today 2' no suspected cerebellar involvement.    Assessment & Plan   CLINICAL IMPRESSIONS  Medical Diagnosis: frequent falls    Treatment Diagnosis: impairment of balance with household/community  activities; muscular weakness   Impression/Assessment: Patient is a 73 year old female with instability and weakness complaints.  The following significant findings have been identified: Pain, Decreased strength, Impaired balance, Impaired gait, Decreased activity tolerance, and Instability. These impairments interfere with their ability to perform household mobility and community mobility as compared to previous level of function.     Clinical Decision Making (Complexity):  Clinical Presentation: Stable/Uncomplicated  Clinical Presentation Rationale: based on medical and personal factors listed in PT evaluation  Clinical Decision Making (Complexity): Low complexity    PLAN OF CARE  Treatment Interventions:  Interventions: Gait Training, Neuromuscular Re-education, Therapeutic Activity, Therapeutic Exercise, Self-Care/Home Management    Long Term Goals     PT Goal 1  Goal Identifier: HEP  Goal Description: Pt will demonstrate IND with strength, balance, and muscular and aerobic endurance HEP, while working to improve capacity for functional mobility.  Goal Progress: initiated on 9/13/2023  Target Date: 11/11/23  PT Goal 2  Goal Identifier: 5xSTS  Goal Description: Pt will complete x5 STS in <30s, without use of B UEs, to demonstrate improvement in B LE strength.  Target Date: 11/11/23  PT Goal 3  Goal Identifier: FGA  Goal Description: Pt will score >25/30pts on FGA, to demonstrate improved dynamic balance and postural control with ambulation, and decreased risk for falling with functional mobility.  Goal Progress: baseline: 20/30 points; at inc risk for falling with functional ambulation.  Target Date: 11/11/23  PT Goal 4  Goal Identifier: SLS  Goal Description: Pt will tolerate SLS on both R/L LEs for duration of 15s, to demonstrate improvement in stability and motor control of balance systems - all for inc safety with functional mobility in her home/community.  Goal Progress: baseline: </=10s on each LE  Target  Date: 11/11/23  PT Goal 5  Goal Identifier: 6MWT  Goal Description: Pt will complete 6 Minute Walk Test, ambulating >/= 400 meters to demonstrate improved tolerance for cardiorespiratory endurance and physical activity.  Target Date: 11/11/23      Frequency of Treatment: 1x/week  Duration of Treatment: 60 days    Education Assessment:   Learner/Method: Patient;Listening;Demonstration;Pictures/Video    Risks and benefits of evaluation/treatment have been explained.   Patient/Family/caregiver agrees with Plan of Care.     Evaluation Time:     PT Eval, Low Complexity Minutes (81740): 15     Signing Clinician: Isabela Gomes, PT, DPT, NCS      [facility]                                                                                   OUTPATIENT PHYSICAL THERAPY      PLAN OF TREATMENT FOR OUTPATIENT REHABILITATION   Patient's Last Name, First Name, Radha Hill YOB: 1950   Provider's Name   [facility]   Medical Record No.  [id number]     Onset Date: 08/30/23 (Order date chosen. Pt reporting her balance has been an evolving problem for ~1yr.)  Start of Care Date: 09/13/23     Medical Diagnosis:  frequent falls      PT Treatment Diagnosis:  impairment of balance with household/community activities; muscular weakness Plan of Treatment  Frequency/Duration: 1x/week/ 60 days    Certification date from 09/13/23 to 11/11/23         See note for plan of treatment details and functional goals     Isabela Gomes PT, DPT, NCS                          I CERTIFY THE NEED FOR THESE SERVICES FURNISHED UNDER        THIS PLAN OF TREATMENT AND WHILE UNDER MY CARE .             Physician Signature               Date    X_____________________________________________________                  Referring Provider:  Jennifer Lombardo PA-C      Initial Assessment  See Epic Evaluation- Start of Care Date: 09/13/23

## 2023-09-13 NOTE — PROGRESS NOTES
09/13/23 1400   Signing Clinician's Name / Credentials   Signing clinician's name / credentials Isabela Gomes, PT, DPT, NCS   Functional Gait Assessment (URI Han, MITCHELL See., et al. (2004))   1. GAIT LEVEL SURFACE 3   2. CHANGE IN GAIT SPEED 3   3. GAIT WITH HORIZONTAL HEAD TURNS 2   4. GAIT WITH VERTICAL HEAD TURNS 2   5. GAIT AND PIVOT TURN 2   6. STEP OVER OBSTACLE 3   7. GAIT WITH NARROW BASE OF SUPPORT 0   8. GAIT WITH EYES CLOSED 1   9. AMBULATING BACKWARDS 2   10. STEPS 2   Total Functional Gait Assessment Score   TOTAL SCORE: (MAXIMUM SCORE 30) 20       Functional Gait Assessment (FGA): The FGA assesses postural stability during various walking tasks.   Gait assistive device used: none      Patient Score: 20/30 - Pt at inc risk for falling 2' lower extremity weakness, pain, and impaired sensory integration.     Scores of <22/30 have been correlated with predicting falls in community-dwelling older adults according to Guille & Arnav 2010.   Scores of <18/30 have been correlated with increased risk for falls in patients with Parkinsons Disease according to Shane Story Zhou et al 2014.  Minimal Detectable Change for patients with acute/chronic stroke = 4.2 according to Thijose & Ritschel 2009  Minimal Detectable Change for patients with vestibular disorder = 8 according to Guille & Arnav 2010     Assessment (rationale for performing, application to patient s function & care plan): s/p recurrent falls with demonstrated balance impairments, repeated as has demonstrated improved gait, balance and overall functional mobility since initial assessment.

## 2023-09-27 ENCOUNTER — THERAPY VISIT (OUTPATIENT)
Dept: PHYSICAL THERAPY | Facility: CLINIC | Age: 73
End: 2023-09-27
Attending: PHYSICIAN ASSISTANT
Payer: MEDICARE

## 2023-09-27 DIAGNOSIS — R29.6 FREQUENT FALLS: ICD-10-CM

## 2023-09-27 DIAGNOSIS — R26.89 BALANCE PROBLEMS: Primary | ICD-10-CM

## 2023-09-27 PROCEDURE — 97110 THERAPEUTIC EXERCISES: CPT | Mod: GP

## 2023-09-27 PROCEDURE — 97112 NEUROMUSCULAR REEDUCATION: CPT | Mod: GP

## 2023-09-29 ENCOUNTER — ANESTHESIA EVENT (OUTPATIENT)
Dept: GASTROENTEROLOGY | Facility: CLINIC | Age: 73
End: 2023-09-29
Payer: MEDICARE

## 2023-09-29 ENCOUNTER — ANESTHESIA (OUTPATIENT)
Dept: GASTROENTEROLOGY | Facility: CLINIC | Age: 73
End: 2023-09-29
Payer: MEDICARE

## 2023-09-29 ENCOUNTER — HOSPITAL ENCOUNTER (OUTPATIENT)
Facility: CLINIC | Age: 73
Discharge: HOME | End: 2023-09-29
Attending: COLON & RECTAL SURGERY | Admitting: COLON & RECTAL SURGERY
Payer: MEDICARE

## 2023-09-29 VITALS
DIASTOLIC BLOOD PRESSURE: 69 MMHG | OXYGEN SATURATION: 99 % | HEART RATE: 66 BPM | BODY MASS INDEX: 23.95 KG/M2 | RESPIRATION RATE: 17 BRPM | SYSTOLIC BLOOD PRESSURE: 114 MMHG | HEIGHT: 60 IN | WEIGHT: 122 LBS

## 2023-09-29 LAB — COLONOSCOPY: NORMAL

## 2023-09-29 PROCEDURE — 45385 COLONOSCOPY W/LESION REMOVAL: CPT | Performed by: COLON & RECTAL SURGERY

## 2023-09-29 PROCEDURE — 88305 TISSUE EXAM BY PATHOLOGIST: CPT | Mod: TC | Performed by: COLON & RECTAL SURGERY

## 2023-09-29 PROCEDURE — 370N000017 HC ANESTHESIA TECHNICAL FEE, PER MIN: Performed by: COLON & RECTAL SURGERY

## 2023-09-29 PROCEDURE — 258N000003 HC RX IP 258 OP 636: Performed by: NURSE ANESTHETIST, CERTIFIED REGISTERED

## 2023-09-29 PROCEDURE — 250N000011 HC RX IP 250 OP 636: Mod: JZ | Performed by: NURSE ANESTHETIST, CERTIFIED REGISTERED

## 2023-09-29 PROCEDURE — 250N000009 HC RX 250: Performed by: NURSE ANESTHETIST, CERTIFIED REGISTERED

## 2023-09-29 PROCEDURE — 250N000011 HC RX IP 250 OP 636: Performed by: NURSE ANESTHETIST, CERTIFIED REGISTERED

## 2023-09-29 PROCEDURE — 999N000010 HC STATISTIC ANES STAT CODE-CRNA PER MINUTE: Performed by: COLON & RECTAL SURGERY

## 2023-09-29 RX ORDER — NALOXONE HYDROCHLORIDE 0.4 MG/ML
0.2 INJECTION, SOLUTION INTRAMUSCULAR; INTRAVENOUS; SUBCUTANEOUS
Status: CANCELLED | OUTPATIENT
Start: 2023-09-29

## 2023-09-29 RX ORDER — PROCHLORPERAZINE MALEATE 5 MG
5 TABLET ORAL EVERY 6 HOURS PRN
Status: CANCELLED | OUTPATIENT
Start: 2023-09-29

## 2023-09-29 RX ORDER — PROPOFOL 10 MG/ML
INJECTION, EMULSION INTRAVENOUS CONTINUOUS PRN
Status: DISCONTINUED | OUTPATIENT
Start: 2023-09-29 | End: 2023-09-29

## 2023-09-29 RX ORDER — ONDANSETRON 2 MG/ML
INJECTION INTRAMUSCULAR; INTRAVENOUS PRN
Status: DISCONTINUED | OUTPATIENT
Start: 2023-09-29 | End: 2023-09-29

## 2023-09-29 RX ORDER — PROPOFOL 10 MG/ML
INJECTION, EMULSION INTRAVENOUS PRN
Status: DISCONTINUED | OUTPATIENT
Start: 2023-09-29 | End: 2023-09-29

## 2023-09-29 RX ORDER — NALOXONE HYDROCHLORIDE 0.4 MG/ML
0.4 INJECTION, SOLUTION INTRAMUSCULAR; INTRAVENOUS; SUBCUTANEOUS
Status: CANCELLED | OUTPATIENT
Start: 2023-09-29

## 2023-09-29 RX ORDER — ONDANSETRON 2 MG/ML
4 INJECTION INTRAMUSCULAR; INTRAVENOUS
Status: CANCELLED | OUTPATIENT
Start: 2023-09-29

## 2023-09-29 RX ORDER — LIDOCAINE HYDROCHLORIDE 20 MG/ML
INJECTION, SOLUTION INFILTRATION; PERINEURAL PRN
Status: DISCONTINUED | OUTPATIENT
Start: 2023-09-29 | End: 2023-09-29

## 2023-09-29 RX ORDER — SODIUM CHLORIDE, SODIUM LACTATE, POTASSIUM CHLORIDE, CALCIUM CHLORIDE 600; 310; 30; 20 MG/100ML; MG/100ML; MG/100ML; MG/100ML
INJECTION, SOLUTION INTRAVENOUS CONTINUOUS PRN
Status: DISCONTINUED | OUTPATIENT
Start: 2023-09-29 | End: 2023-09-29

## 2023-09-29 RX ORDER — ONDANSETRON 4 MG/1
4 TABLET, ORALLY DISINTEGRATING ORAL EVERY 6 HOURS PRN
Status: CANCELLED | OUTPATIENT
Start: 2023-09-29

## 2023-09-29 RX ORDER — ONDANSETRON 2 MG/ML
4 INJECTION INTRAMUSCULAR; INTRAVENOUS EVERY 6 HOURS PRN
Status: CANCELLED | OUTPATIENT
Start: 2023-09-29

## 2023-09-29 RX ORDER — DEXMEDETOMIDINE HYDROCHLORIDE 4 UG/ML
INJECTION, SOLUTION INTRAVENOUS PRN
Status: DISCONTINUED | OUTPATIENT
Start: 2023-09-29 | End: 2023-09-29

## 2023-09-29 RX ORDER — FLUMAZENIL 0.1 MG/ML
0.2 INJECTION, SOLUTION INTRAVENOUS
Status: CANCELLED | OUTPATIENT
Start: 2023-09-29 | End: 2023-09-30

## 2023-09-29 RX ORDER — LIDOCAINE 40 MG/G
CREAM TOPICAL
Status: CANCELLED | OUTPATIENT
Start: 2023-09-29

## 2023-09-29 RX ADMIN — PROPOFOL 40 MG: 10 INJECTION, EMULSION INTRAVENOUS at 16:02

## 2023-09-29 RX ADMIN — PHENYLEPHRINE HYDROCHLORIDE 200 MCG: 10 INJECTION INTRAVENOUS at 16:17

## 2023-09-29 RX ADMIN — LIDOCAINE HYDROCHLORIDE 40 MG: 20 INJECTION, SOLUTION INFILTRATION; PERINEURAL at 16:02

## 2023-09-29 RX ADMIN — ONDANSETRON 4 MG: 2 INJECTION INTRAMUSCULAR; INTRAVENOUS at 16:02

## 2023-09-29 RX ADMIN — PROPOFOL 150 MCG/KG/MIN: 10 INJECTION, EMULSION INTRAVENOUS at 16:03

## 2023-09-29 RX ADMIN — PHENYLEPHRINE HYDROCHLORIDE 100 MCG: 10 INJECTION INTRAVENOUS at 16:39

## 2023-09-29 RX ADMIN — SODIUM CHLORIDE, POTASSIUM CHLORIDE, SODIUM LACTATE AND CALCIUM CHLORIDE: 600; 310; 30; 20 INJECTION, SOLUTION INTRAVENOUS at 16:02

## 2023-09-29 RX ADMIN — PHENYLEPHRINE HYDROCHLORIDE 100 MCG: 10 INJECTION INTRAVENOUS at 16:44

## 2023-09-29 RX ADMIN — DEXMEDETOMIDINE HYDROCHLORIDE 12 MCG: 200 INJECTION INTRAVENOUS at 16:04

## 2023-09-29 RX ADMIN — PHENYLEPHRINE HYDROCHLORIDE 100 MCG: 10 INJECTION INTRAVENOUS at 16:34

## 2023-09-29 RX ADMIN — PHENYLEPHRINE HYDROCHLORIDE 100 MCG: 10 INJECTION INTRAVENOUS at 16:28

## 2023-09-29 RX ADMIN — DEXMEDETOMIDINE HYDROCHLORIDE 8 MCG: 200 INJECTION INTRAVENOUS at 16:06

## 2023-09-29 RX ADMIN — PROPOFOL 20 MG: 10 INJECTION, EMULSION INTRAVENOUS at 16:05

## 2023-09-29 ASSESSMENT — ACTIVITIES OF DAILY LIVING (ADL)
ADLS_ACUITY_SCORE: 33
ADLS_ACUITY_SCORE: 35
ADLS_ACUITY_SCORE: 35

## 2023-09-29 NOTE — ANESTHESIA PREPROCEDURE EVALUATION
Anesthesia Pre-Procedure Evaluation    Patient: Radha Bull   MRN: [id number] : 1950        Procedure : Procedure(s):  Colonoscopy          Past Medical History:   Diagnosis Date    Aortic insufficiency     Arthritis     Chronic neck pain     Colon polyps     Depressive disorder     Fibromyalgia     HLD (hyperlipidemia)     Insomnia     Migraine     Osteopenia     PONV (postoperative nausea and vomiting)     Rectal prolapse     SLE (systemic lupus erythematosus related syndrome) (H)     Spastic colon     possible IBS      Past Surgical History:   Procedure Laterality Date    ABDOMEN SURGERY      lap x 2 for infertility    APPENDECTOMY      AS REPAIR OF RECTUM,PROLAPSE MUCOSA      COLONOSCOPY      COLONOSCOPY N/A 2016    Procedure: COMBINED COLONOSCOPY, SINGLE OR MULTIPLE BIOPSY/POLYPECTOMY BY BIOPSY;  Surgeon: Madisyn Rodrigues MD;  Location:  GI    ENT SURGERY      removal of mass in lymph node of neck    Neck mass removed      benign    oral grafting      ORTHOPEDIC SURGERY Left     trigger finger    RECTAL PROLAPSE REPAIR      REVERSE ARTHROPLASTY SHOULDER Left 10/18/2021    Procedure: LEFT REVERSE SHOULDER ARTHROPLASTY WITH NO CUSTOM GUIDE;  Surgeon: Keyur Oquendo MD;  Location:  OR    TONSILLECTOMY & ADENOIDECTOMY        Allergies   Allergen Reactions    Droperidol Other (See Comments)     Lock jaw    Pcn [Penicillins]     Sulfa Antibiotics Nausea and Vomiting      Social History     Tobacco Use    Smoking status: Never    Smokeless tobacco: Never   Substance Use Topics    Alcohol use: Yes     Comment: 4 glasses of wine a week      Wt Readings from Last 1 Encounters:   23 55.3 kg (122 lb)        Anesthesia Evaluation   Pt has had prior anesthetic.     History of anesthetic complications  - PONV.      ROS/MED HX  ENT/Pulmonary: Comment: insomnia   (-) sleep apnea   Neurologic:     (+)      migraines,                          Cardiovascular:     (+) Dyslipidemia - -   -  - -                            valvular problems/murmurs type: AI       (-) pacemaker, stent, pacemaker and ICD   METS/Exercise Tolerance: >4 METS    Hematologic: Comments: SLE - neg hematologic  ROS     Musculoskeletal:  - neg musculoskeletal ROS     GI/Hepatic:    (-) GERD   Renal/Genitourinary:    (-) renal disease   Endo:    (-) Type II DM   Psychiatric/Substance Use:     (+) psychiatric history depression       Infectious Disease:  - neg infectious disease ROS     Malignancy:  - neg malignancy ROS     Other:  - neg other ROS          Physical Exam    Airway        Mallampati: II   TM distance: > 3 FB   Neck ROM: full   Mouth opening: > 3 cm    Respiratory Devices and Support         Dental       (+) Minor Abnormalities - some fillings, tiny chips      Cardiovascular          Rhythm and rate: regular and normal     Pulmonary           breath sounds clear to auscultation           OUTSIDE LABS:  CBC:   Lab Results   Component Value Date    HGB 10.6 (L) 10/20/2021    HGB 11.2 (L) 10/19/2021     BMP:   Lab Results   Component Value Date     08/29/2023     09/15/2022    POTASSIUM 3.9 08/29/2023    POTASSIUM 4.3 09/15/2022    CHLORIDE 104 08/29/2023    CHLORIDE 103 09/15/2022    CO2 25 08/29/2023    CO2 29 09/15/2022    BUN 11.3 08/29/2023    BUN 12.3 09/15/2022    CR 0.78 08/29/2023    CR 0.84 09/15/2022    GLC 76 08/29/2023    GLC 86 09/15/2022     COAGS: No results found for: PTT, INR, FIBR  POC: No results found for: BGM, HCG, HCGS  HEPATIC:   Lab Results   Component Value Date    ALBUMIN 4.4 08/29/2023    PROTTOTAL 6.4 08/29/2023    ALT 27 08/29/2023    AST 29 08/29/2023    ALKPHOS 100 08/29/2023    BILITOTAL 0.4 08/29/2023     OTHER:   Lab Results   Component Value Date    CHINEDU 9.3 08/29/2023    TSH 1.73 09/15/2022    CRP 0.1 09/25/2019       Anesthesia Plan    ASA Status:  2       Anesthesia Type: MAC.     - Reason for MAC: immobility needed              Consents    Anesthesia Plan(s) and associated risks,  benefits, and realistic alternatives discussed. Questions answered and patient/representative(s) expressed understanding.     - Discussed: Risks, Benefits and Alternatives for the PROCEDURE were discussed     - Discussed with:  Patient            Postoperative Care    Pain management: Oral pain medications, IV analgesics.   PONV prophylaxis: Ondansetron (or other 5HT-3)     Comments:    Other Comments: Avoiding narcotics due to PONV             Joe Patel MD

## 2023-09-29 NOTE — H&P
Pre-Endoscopy History and Physical     Radha Bull MRN# [id number]   YOB: 1950 Age: 73 year old     Date of Procedure: 9/29/2023  Primary care provider: Jennifer Lombardo  Type of Endoscopy: Colonoscopy  Reason for Procedure: H/O polyps  Type of Anesthesia Anticipated: Moderate Sedation    HPI:    Radha is a 73 year old female who will be undergoing the above procedure.      A history and physical has been performed. The patient's medications and allergies have been reviewed. The risks and benefits of the procedure and the sedation options and risks were discussed with the patient.  All questions were answered and informed consent was obtained.      She denies a personal or family history of anesthesia complications or bleeding disorders.     Allergies   Allergen Reactions    Droperidol Other (See Comments)     Lock jaw    Pcn [Penicillins]     Sulfa Antibiotics Nausea and Vomiting        No current facility-administered medications on file prior to encounter.  acetaminophen (TYLENOL) 325 MG tablet, Take 2 tablets (650 mg) by mouth every 4 hours as needed for other (For optimal non-opioid multimodal pain management to improve pain control.)  aspirin (ASA) 325 MG EC tablet, Take 1 tablet (325 mg) by mouth daily  BIOTIN PO, Take 1 chew tab by mouth 2 times daily  buPROPion (WELLBUTRIN XL) 300 MG 24 hr tablet, Take 300 mg by mouth every morning  Diclofenac Sodium 3 % GEL, Externally apply topically daily as needed  escitalopram (LEXAPRO) 10 MG tablet, Take 15 mg by mouth daily (1.5 X 10 mg)  Lidocaine (LIDOCARE) 4 % Patch, Place 1 patch onto the skin every 24 hours To prevent lidocaine toxicity, patient should be patch free for 12 hrs daily.  loperamide (IMODIUM) 2 MG capsule, Take 2 mg by mouth once as needed for diarrhea  melatonin 3-10 MG TABS, Take 1 tablet by mouth nightly as needed  ondansetron (ZOFRAN-ODT) 4 MG ODT tab, Take 1 tablet (4 mg) by mouth every 6 hours as needed for nausea or  vomiting  oxyCODONE (ROXICODONE) 5 MG tablet, Take 1 tablet (5 mg) by mouth every 6 hours as needed for severe pain  pramipexole (MIRAPEX) 0.125 MG tablet, Take 0.125 mg by mouth daily as needed  Probiotic Product (PROBIOTIC DAILY PO), Take 1 capsule by mouth 2 times daily  rizatriptan (MAXALT) 5 MG tablet, Take 5 mg by mouth at onset of headache for migraine  SENNA-docusate sodium (SENNA S) 8.6-50 MG tablet, Take 1-2 tablets by mouth twice daily while taking oral narcotics to treat and prevent constipation  simvastatin (ZOCOR) 40 MG tablet, Take 40 mg by mouth every other day  triamcinolone (KENALOG) 0.1 % external ointment, Apply topically daily as needed for irritation  vitamin D3 (CHOLECALCIFEROL) 50 mcg (2000 units) tablet, Take 1 tablet by mouth daily  eszopiclone (LUNESTA) 3 MG tablet, Take 3 mg by mouth At Bedtime  propranolol (INDERAL) 10 MG tablet, Take 10 mg by mouth daily as needed        Patient Active Problem List   Diagnosis    ACP (advance care planning)    Postoperative state        Past Medical History:   Diagnosis Date    Aortic insufficiency     Arthritis     Chronic neck pain     Colon polyps     Depressive disorder     Fibromyalgia     HLD (hyperlipidemia)     Insomnia     Migraine     Osteopenia     PONV (postoperative nausea and vomiting)     Rectal prolapse     SLE (systemic lupus erythematosus related syndrome) (H)     Spastic colon     possible IBS        Past Surgical History:   Procedure Laterality Date    ABDOMEN SURGERY      lap x 2 for infertility    APPENDECTOMY      AS REPAIR OF RECTUM,PROLAPSE MUCOSA      COLONOSCOPY      COLONOSCOPY N/A 8/24/2016    Procedure: COMBINED COLONOSCOPY, SINGLE OR MULTIPLE BIOPSY/POLYPECTOMY BY BIOPSY;  Surgeon: Madisyn Rodrigues MD;  Location:  GI    ENT SURGERY      removal of mass in lymph node of neck    Neck mass removed      benign    oral grafting      ORTHOPEDIC SURGERY Left     trigger finger    RECTAL PROLAPSE REPAIR      REVERSE  ARTHROPLASTY SHOULDER Left 10/18/2021    Procedure: LEFT REVERSE SHOULDER ARTHROPLASTY WITH NO CUSTOM GUIDE;  Surgeon: Keyur Oquendo MD;  Location: [facility] OR    TONSILLECTOMY & ADENOIDECTOMY         Social History     Tobacco Use    Smoking status: Never    Smokeless tobacco: Never   Substance Use Topics    Alcohol use: Yes     Comment: 4 glasses of wine a week       Family History   Problem Relation Age of Onset    Cancer Father     Multiple Sclerosis Sister        REVIEW OF SYSTEMS:     5 point ROS negative except as noted above in HPI, including Gen., Resp., CV, GI &  system review.      PHYSICAL EXAM:   /78   Pulse 71   Resp 16   Ht 1.524 m (5')   Wt 55.3 kg (122 lb)   SpO2 97%   BMI 23.83 kg/m   Estimated body mass index is 23.83 kg/m  as calculated from the following:    Height as of this encounter: 1.524 m (5').    Weight as of this encounter: 55.3 kg (122 lb).   GENERAL APPEARANCE: healthy and alert  MENTAL STATUS: alert  AIRWAY EXAM: Mallampatti Class I (visualization of the soft palate, fauces, uvula, anterior and posterior pillars)  RESP: lungs clear to auscultation - no rales, rhonchi or wheezes  CV: regular rates and rhythm      IMPRESSION   ASA Class 2 - Mild systemic disease        PLAN:     Plan for colonoscopy. We discussed the risks, benefits and alternatives and the patient wished to proceed.    The above has been forwarded to the consulting provider.      Karlene Justin MD  [facility]  Phone: [phone]  Fax: [phone]  September 29, 2023

## 2023-09-29 NOTE — ANESTHESIA CARE TRANSFER NOTE
Patient: Radha Bull    Procedure: Procedure(s):  Colonoscopy       Diagnosis: Colon polyps [K63.5]  Diagnosis Additional Information: No value filed.    Anesthesia Type:   MAC     Note:    Oropharynx: oropharynx clear of all foreign objects  Level of Consciousness: drowsy  Oxygen Supplementation: nasal cannula    Independent Airway: airway patency satisfactory and stable  Dentition: dentition unchanged  Vital Signs Stable: post-procedure vital signs reviewed and stable  Report to RN Given: handoff report given  Patient transferred to: PACU    Handoff Report: Identifed the Patient, Identified the Reponsible Provider, Reviewed the pertinent medical history, Discussed the surgical course, Reviewed Intra-OP anesthesia mangement and issues during anesthesia, Set expectations for post-procedure period and Allowed opportunity for questions and acknowledgement of understanding  Vitals:  Vitals Value Taken Time   BP     Temp     Pulse 79 09/29/23 1653   Resp 13 09/29/23 1653   SpO2 97 % 09/29/23 1653   Vitals shown include unvalidated device data.    Electronically Signed By: ANGELICA Frank CRNA  September 29, 2023  4:54 PM

## 2023-09-30 NOTE — ANESTHESIA POSTPROCEDURE EVALUATION
Patient: Radha Bull    Procedure: Procedure(s):  Colonoscopy       Anesthesia Type:  MAC    Note:  Disposition: Outpatient   Postop Pain Control: Uneventful            Sign Out: Well controlled pain   PONV: No   Neuro/Psych: Uneventful            Sign Out: Acceptable/Baseline neuro status   Airway/Respiratory: Uneventful            Sign Out: Acceptable/Baseline resp. status   CV/Hemodynamics: Uneventful            Sign Out: Acceptable CV status; No obvious hypovolemia; No obvious fluid overload   Other NRE: NONE   DID A NON-ROUTINE EVENT OCCUR? No           Last vitals:  Vitals Value Taken Time   /69 09/29/23 1721   Temp     Pulse 66 09/29/23 1721   Resp 18 09/29/23 1721   SpO2 99 % 09/29/23 1712   Vitals shown include unvalidated device data.    Electronically Signed By: Mark Sorenson DO  September 29, 2023  8:38 PM

## 2023-10-04 LAB
PATH REPORT.COMMENTS IMP SPEC: NORMAL
PATH REPORT.COMMENTS IMP SPEC: NORMAL
PATH REPORT.FINAL DX SPEC: NORMAL
PATH REPORT.GROSS SPEC: NORMAL
PATH REPORT.MICROSCOPIC SPEC OTHER STN: NORMAL
PATH REPORT.RELEVANT HX SPEC: NORMAL
PHOTO IMAGE: NORMAL

## 2023-10-04 PROCEDURE — 88305 TISSUE EXAM BY PATHOLOGIST: CPT | Mod: 26 | Performed by: PATHOLOGY

## 2023-11-09 NOTE — PROGRESS NOTES
09/27/23 0500   Appointment Info   Signing clinician's name / credentials Isabela Gomes, PT, DPT, NCS   Visits Used 2   Medical Diagnosis frequent falls   PT Tx Diagnosis impairment of balance with household/community activities; muscular weakness   Quick Adds Certification   Progress Note/Certification   Start of Care Date 09/13/23   Onset of illness/injury or Date of Surgery 08/30/23  (Order date chosen. Pt reporting her balance has been an evolving problem for ~1yr.)   Therapy Frequency 1x/week   Predicted Duration 60 days   Certification date from 09/13/23   Certification date to 11/11/23   Progress Note Due Date   (at 10th)   Progress Note Completed Date 09/13/23   GOALS   PT Goals 2;3;4;5   PT Goal 1   Goal Identifier HEP   Goal Description Pt will demonstrate IND with strength, balance, and muscular and aerobic endurance HEP, while working to improve capacity for functional mobility.   Goal Progress 9/27/2023: Updated pt's HEP; see PTRx.   Target Date 11/11/23   PT Goal 2   Goal Identifier 5xSTS   Goal Description Pt will complete x5 STS in <30s, without use of B UEs, to demonstrate improvement in B LE strength.   Goal Progress 9/27/2023: 17.8s; improved performance with slightly staggered stance. Verbal cueing for scooting forward to the edge of the chair. Score is improving from baseline.   Target Date 11/11/23   PT Goal 3   Goal Identifier FGA   Goal Description Pt will score >25/30pts on FGA, to demonstrate improved dynamic balance and postural control with ambulation, and decreased risk for falling with functional mobility.   Goal Progress baseline: 20/30 points; at inc risk for falling with functional ambulation.   Target Date 11/11/23   PT Goal 4   Goal Identifier SLS   Goal Description Pt will tolerate SLS on both R/L LEs for duration of 15s, to demonstrate improvement in stability and motor control of balance systems - all for inc safety with functional mobility in her home/community.   Goal  "Progress baseline: </=10s on each LE   Target Date 11/11/23   PT Goal 5   Goal Identifier MET: 6MWT   Goal Description Pt will complete 6 Minute Walk Test, ambulating >/= 400 meters to demonstrate improved tolerance for cardiorespiratory endurance and physical activity.   Goal Progress 9/27/2023: Pt with 425m on the 6MWT; meeting goal for tolerance to aerobic activity. Slightly below age matched norms; limited by right hip pain.   Target Date 11/11/23   Date Met 09/27/23   Subjective Report   Subjective Report Pt reporting her HEP is slightly difficult - STS is hard, ankle pumps with band are difficult, eyes closed is hard to work into the day, and balance is \"hit or miss.\"   Treatment Interventions (PT)   Interventions Neuromuscular Re-education;Therapeutic Procedure/Exercise   Therapeutic Procedure/Exercise   Therapeutic Procedures: strength, endurance, ROM, flexibillity minutes (48380) 25   Skilled Intervention 6MWT; 5xSTS; strengthening; HEP; pt edu   Patient Response/Progress Completed 6MWT; see goal above. Pt with stronger performance for her first attempt than PT had anticipated 2' R hip pain and reports of unsteadiness on her feet. Pt with no LOB or toe-dragging episodes, for which the pt was not able to self-correct with IND. Provided pt edu for continuation of seated ankle pump exercise with red theraband in a loop; x20 on R/L - updated HEP. She's also to continue STS exercise; completed 5xSTS - see above. Found improvement with scooting forward to edge of chair and slightly staggering feet. Added toe-tapping at edge of the bottom step x20 on R/L LEs, without arm support. Intention to improve hip flexion strength and SLS balance. Pt in support. She also completed stairs in fashion to avoid inc of R hip pain, if it does occur. See updated HEP in PTRx.   Neuromuscular Re-education   Neuromuscular re-ed of mvmt, balance, coord, kinesthetic sense, posture, proprioception minutes (95154) 15   Skilled " Intervention static balance; HEP; pt edu   Patient Response/Progress Completed static balance activities of EC on stable vs. unstable surfaces; intention for improved sensory integration (vestibular; somatosensory). Pt with strong performance while maintaining upright with EC and PT SUPV; however, reporting she feels very unsteady internally. PT provided pt edu related to benefit of repetitive training to habituate unsteady symptoms, getting familiar to new shoes/inserts, and improving her confidence with her balance. EC on unstable surface to remain part of the pt's HEP. Pt in support.   Education   Learner/Method Patient;Listening;Demonstration;Pictures/Video   Plan   Home program See PTRx.   Plan for next session f/u on HEP; progress HEP; add high-level dynamic balance challenges; functional strength   Comments   Comments Pt leaving in late October for [place]. To return in May.   Total Session Time   Timed Code Treatment Minutes 40   Total Treatment Time (sum of timed and untimed services) 40       DISCHARGE  Reason for Discharge: Patient chooses to discontinue therapy, as she has left for [place] for the winter months.     Equipment Issued: N/A    Discharge Plan: Patient to continue home program.    Referring Provider:  Jennifer Lombardo PA-C

## 2023-11-18 ENCOUNTER — HEALTH MAINTENANCE LETTER (OUTPATIENT)
Age: 73
End: 2023-11-18

## 2024-09-12 ENCOUNTER — LAB REQUISITION (OUTPATIENT)
Dept: LAB | Facility: CLINIC | Age: 74
End: 2024-09-12
Payer: MEDICARE

## 2024-09-12 DIAGNOSIS — M85.89 OTHER SPECIFIED DISORDERS OF BONE DENSITY AND STRUCTURE, MULTIPLE SITES: ICD-10-CM

## 2024-09-12 DIAGNOSIS — E78.2 MIXED HYPERLIPIDEMIA: ICD-10-CM

## 2024-09-12 LAB
ALBUMIN SERPL BCG-MCNC: 4.4 G/DL (ref 3.5–5.2)
ALP SERPL-CCNC: 96 U/L (ref 40–150)
ALT SERPL W P-5'-P-CCNC: 14 U/L (ref 0–50)
ANION GAP SERPL CALCULATED.3IONS-SCNC: 12 MMOL/L (ref 7–15)
AST SERPL W P-5'-P-CCNC: 25 U/L (ref 0–45)
BILIRUB SERPL-MCNC: 0.5 MG/DL
BUN SERPL-MCNC: 13.1 MG/DL (ref 8–23)
CALCIUM SERPL-MCNC: 9.1 MG/DL (ref 8.8–10.4)
CHLORIDE SERPL-SCNC: 104 MMOL/L (ref 98–107)
CHOLEST SERPL-MCNC: 249 MG/DL
CREAT SERPL-MCNC: 0.98 MG/DL (ref 0.51–0.95)
EGFRCR SERPLBLD CKD-EPI 2021: 61 ML/MIN/1.73M2
FASTING STATUS PATIENT QL REPORTED: ABNORMAL
FASTING STATUS PATIENT QL REPORTED: ABNORMAL
GLUCOSE SERPL-MCNC: 87 MG/DL (ref 70–99)
HCO3 SERPL-SCNC: 24 MMOL/L (ref 22–29)
HDLC SERPL-MCNC: 70 MG/DL
LDLC SERPL CALC-MCNC: 157 MG/DL
NONHDLC SERPL-MCNC: 179 MG/DL
POTASSIUM SERPL-SCNC: 3.9 MMOL/L (ref 3.4–5.3)
PROT SERPL-MCNC: 6.7 G/DL (ref 6.4–8.3)
SODIUM SERPL-SCNC: 140 MMOL/L (ref 135–145)
TRIGL SERPL-MCNC: 111 MG/DL
VIT D+METAB SERPL-MCNC: 54 NG/ML (ref 20–50)

## 2024-09-12 PROCEDURE — 80053 COMPREHEN METABOLIC PANEL: CPT | Mod: ORL | Performed by: PHYSICIAN ASSISTANT

## 2024-09-12 PROCEDURE — 82306 VITAMIN D 25 HYDROXY: CPT | Mod: ORL | Performed by: PHYSICIAN ASSISTANT

## 2024-09-12 PROCEDURE — 80061 LIPID PANEL: CPT | Mod: ORL | Performed by: PHYSICIAN ASSISTANT

## 2024-09-20 ENCOUNTER — TRANSCRIBE ORDERS (OUTPATIENT)
Dept: OTHER | Age: 74
End: 2024-09-20

## 2024-09-20 DIAGNOSIS — M54.2 CERVICALGIA: Primary | ICD-10-CM

## 2024-10-14 ENCOUNTER — ANCILLARY PROCEDURE (OUTPATIENT)
Dept: GENERAL RADIOLOGY | Facility: CLINIC | Age: 74
End: 2024-10-14
Attending: NURSE PRACTITIONER
Payer: MEDICARE

## 2024-10-14 ENCOUNTER — THERAPY VISIT (OUTPATIENT)
Dept: PHYSICAL THERAPY | Facility: CLINIC | Age: 74
End: 2024-10-14
Attending: PHYSICIAN ASSISTANT
Payer: MEDICARE

## 2024-10-14 ENCOUNTER — OFFICE VISIT (OUTPATIENT)
Dept: URGENT CARE | Facility: URGENT CARE | Age: 74
End: 2024-10-14
Payer: MEDICARE

## 2024-10-14 VITALS
OXYGEN SATURATION: 96 % | SYSTOLIC BLOOD PRESSURE: 146 MMHG | TEMPERATURE: 97.8 F | HEART RATE: 58 BPM | DIASTOLIC BLOOD PRESSURE: 86 MMHG | WEIGHT: 127.2 LBS | BODY MASS INDEX: 24.84 KG/M2

## 2024-10-14 DIAGNOSIS — M26.621 TMJ TENDERNESS, RIGHT: ICD-10-CM

## 2024-10-14 DIAGNOSIS — Z91.81 PERSONAL HISTORY OF FALL: ICD-10-CM

## 2024-10-14 DIAGNOSIS — S00.83XA CONTUSION OF OTHER PART OF HEAD, INITIAL ENCOUNTER: Primary | ICD-10-CM

## 2024-10-14 DIAGNOSIS — R29.818 DIFFICULTY BALANCING: ICD-10-CM

## 2024-10-14 DIAGNOSIS — R29.6 FALLS FREQUENTLY: ICD-10-CM

## 2024-10-14 DIAGNOSIS — M54.2 NECK PAIN: Primary | ICD-10-CM

## 2024-10-14 PROCEDURE — 97161 PT EVAL LOW COMPLEX 20 MIN: CPT | Mod: GP | Performed by: PHYSICAL THERAPIST

## 2024-10-14 PROCEDURE — 99204 OFFICE O/P NEW MOD 45 MIN: CPT | Performed by: NURSE PRACTITIONER

## 2024-10-14 PROCEDURE — 97110 THERAPEUTIC EXERCISES: CPT | Mod: GP | Performed by: PHYSICAL THERAPIST

## 2024-10-14 PROCEDURE — 70150 X-RAY EXAM OF FACIAL BONES: CPT | Mod: TC | Performed by: PREVENTIVE MEDICINE

## 2024-10-14 RX ORDER — CYCLOBENZAPRINE HCL 5 MG
5 TABLET ORAL DAILY
Qty: 7 TABLET | Refills: 0 | Status: SHIPPED | OUTPATIENT
Start: 2024-10-14

## 2024-10-14 ASSESSMENT — PAIN SCALES - GENERAL: PAINLEVEL: MILD PAIN (2)

## 2024-10-14 ASSESSMENT — ENCOUNTER SYMPTOMS
RESPIRATORY NEGATIVE: 1
CONSTITUTIONAL NEGATIVE: 1
CARDIOVASCULAR NEGATIVE: 1
EYES NEGATIVE: 1

## 2024-10-14 NOTE — PATIENT INSTRUCTIONS
Contusion treatment:  Rest: Stay off the affected site. Soft diet.   Ice: Ice several times throughout the day. Perform 20 minutes on, 20 minutes off 4 or more times per day as needed.  Elevation: Rest and elevate the affected site, may elevate on pillow, try to elevate above heart (lay flat with injury propped higher than body)    For pain control you can rotate between Tylenol 1000 mg every 8 hours as needed    For muscle spasms/TMJ pain - cyclobenzaprine 5 mg once daily as needed.

## 2024-10-14 NOTE — PROGRESS NOTES
Assessment & Plan       ICD-10-CM    1. Contusion of other part of head, initial encounter  S00.83XA       2. Personal history of fall  Z91.81 XR Facial Bone G/E 3 Views      3. TMJ tenderness, right  M26.621 cyclobenzaprine (FLEXERIL) 5 MG tablet         Slightly raised area on right temporal area right above ear where glasses rest and pressed into her head when she fell. Tylenol as needed.    Patient Instructions   Contusion treatment:  Rest: Stay off the affected site. Soft diet.   Ice: Ice several times throughout the day. Perform 20 minutes on, 20 minutes off 4 or more times per day as needed.  Elevation: Rest and elevate the affected site, may elevate on pillow, try to elevate above heart (lay flat with injury propped higher than body)    For pain control you can rotate between Tylenol 1000 mg every 8 hours as needed    For muscle spasms/TMJ pain - cyclobenzaprine 5 mg once daily as needed.      If site worsens or symptoms do not continue to improve go to the Emergency Department for further evaluation. Patient agreed to plan and verbalized understanding.     I independently visualized the xray:  no signs of fracture or dislocation for the face. Radiologist read pending at this time.       Subjective     Radha is a 74 year old female who presents to clinic today for the following health issues:  Chief Complaint   Patient presents with    Urgent Care     Fell about 1 wk and a half ago and hurt R jaw, feels better but still has some pain and has a lump on R side of head     HPI  Approximately 10 days ago patient was taking off her jeans and leaning against the bed and she slipped hitting the right temporal area on suitcase sitting upright.  She states since then she has noticed a slightly raised area above where her glasses sit superiorly to her ear (and where she hit her head on the suitcase). She states her right jaw has been sore. However, both sites have been improving but not resolved. She has been taking  Tylenol with minimal relief of her symptoms. No other injury. No loss of consciousness. Patient does have a history of arthritis, fibromyalgia, and chronic neck pain.      Review of Systems   Constitutional: Negative.    HENT: Negative.     Eyes: Negative.    Respiratory: Negative.     Cardiovascular: Negative.    Musculoskeletal:         Has baseline myalgias and arthralgias due to chronic neck pain, arthritis, and fibromyalgia. Has had increased right jaw pain and right temporal pain (where her glasses rest on her (she was wearing them at the time when she slipped))   Neurological:         No new symptoms, at baseline.       Problem List:  2021-10: Postoperative state  2016-11: ACP (advance care planning)  2009-01: Cervicalgia      Past Medical History:   Diagnosis Date    Aortic insufficiency     Arthritis     Chronic neck pain     Colon polyps     Depressive disorder     Fibromyalgia     HLD (hyperlipidemia)     Insomnia     Migraine     Osteopenia     PONV (postoperative nausea and vomiting)     Rectal prolapse     SLE (systemic lupus erythematosus related syndrome) (H)     Spastic colon     possible IBS         Social History     Tobacco Use    Smoking status: Never    Smokeless tobacco: Never   Substance Use Topics    Alcohol use: Yes     Comment: 4 glasses of wine a week           Objective    BP (!) 146/86   Pulse 58   Temp 97.8  F (36.6  C) (Tympanic)   Wt 57.7 kg (127 lb 3.2 oz)   SpO2 96%   BMI 24.84 kg/m    Physical Exam   General Appearance:  Alert, cooperative, no distress, appears stated age. Afebrile. Appears comfortable sitting in chair.  Integument: Warm, dry, no rashes or lesions.  HEENT: Atraumatic, normocephalic. Face nontraumatic although she does have a mildly raised area on the right temporal area where her glasses rest - no notable erythema or ecchymosis. Conjunctiva clear, Lids normal. PERRL. EOMI. Tongue and uvula midline.  No hemotympanum or  ecchymosis posterior to either  ear.  Neck: Supple. No C-spine tenderness.  Neck ROM at baseline per patient.  Respiratory: No distress. Equal air entry to bilateral bases. Lungs clear to ausculation bilaterally. No crackles, wheezes, rhonchi or stridor.  Cardiovascular: Regular rate and rhythm, no murmur, rub or gallop. No obvious chest wall deformities.  GI: abdomen is soft, non-distended, and nontender  Musculoskeletal: Back: No T or L-spine tenderness or crepitus to palpation. Strength testing: shoulder abduction, fwd flexion 5/5 bilaterally. Elbow flexion/extension,  strength 5/5 bilaterally. Gait: observed, no antalgia or abnormalities. Steady gait.  Neurologic: Alert and orientated appropriately. No focal deficits. Sensation intact in distal UEs. DTRs: patellar and achilles 2+ bilaterally.  Psych: Normal mood and affect.       June Edmonds NP

## 2024-10-14 NOTE — PROGRESS NOTES
"PHYSICAL THERAPY EVALUATION  Type of Visit: Evaluation       Fall Risk Screen:  Fall screen completed by: PT  Have you fallen 2 or more times in the past year?: Yes (fell 3 times last week)  Have you fallen and had an injury in the past year?: Yes  Timed Up and Go score (seconds): -- (did not assess due to time constraints, patient is a falls risk)  Is patient a fall risk?: Yes; Department fall risk interventions implemented    Subjective       Presenting condition or subjective complaint: neck pain and balance    Reports insidious onset, intermittent neck pain (crampy) beginning around a month ago. Denies radiation of pain, numbness or tingling. This pain increases with cervical rotation and walking (being upright). Uses heat and Tylenol for temporary relief.     Also complains of difficulty with balance and frequent falls for the past year. States she fell 3 times last week. Most of her falls are \"trips\", she lifts her foot and she \"misses\". Also reports tipping over because she just \"tips\" and by the time she realizes she is falling, she is already down. Did switch to [place]as a year ago at the recommendation of her podiatrist due to OA in her R foot, wonders if this is leading to her balance difficulties. States her PCP ordered a brain MRI last year, states this was normal. Also reports LE weakness, L>R, but feels this started prior to the balance issue. Did participate in 1 round of PT last year; however, went to [place] for the winter and wasn't good at continuing her PT. Will be leaving for [place] next week for the winter, planning on returning mid Dec 2024 for a couple of weeks, then back to [place]. Hoping to cont PT in [place] while home in mid Dec.    Date of onset: 09/14/24    Relevant medical history: Depression; Fibromyalgia; Hearing problems; Migraines or headaches; Osteoarthritis   Dates & types of surgery:      Prior diagnostic imaging/testing results: MRI     Prior therapy history for the same diagnosis, illness or " injury: Yes last year    Prior Level of Function  Transfers: Independent  Ambulation: Independent  ADL: Independent  IADL:     Living Environment  Social support: With a significant other or spouse   Type of home:     Stairs to enter the home:         Ramp:     Stairs inside the home:         Help at home:    Equipment owned:       Employment:      Hobbies/Interests:      Patient goals for therapy: 1 month (neck), 1 year (balanc)    Pain assessment: Location: neck/Ratin/10 at rest, 6-7/10 at worst     Objective     Standing Alignment:    Cervical/Thoracic:  Forward head  Shoulder/UE:  Rounded shoulders                  Flexibility/Screens:     Upper Extremity:    Decreased left upper extremity flexibility at:  Pectoralis Major and Pectoralis Minor    Decreased right upper extremity flexibility present at:  Pectoralis Major and Pectoralis Minor    Spine:  Decreased left spine flexibility:  Upper Trap and Levator    Decreased right spine flexibility:  Upper Trap and Levator              Cervical/Thoracic Evaluation  Arom wnl cervical: repeated cervical retraction: no increase in pain during, slight improvement in R rot following, no change in L rot.     AROM:  AROM Cervical:    Flexion:            75% neck and B shoulder pain  Extension:       15%  Rotation:         Left: 15% B neck pain     Right: 25% L sided neck pain  Side Bend:      Left:     Right:       Headaches: migraine  Cervical Myotomes:  normal                        Cervical Palpation:    Tenderness present at Left:    Upper Trap; Erector Spinae and Suboccipitals  Tenderness not present at Left:   Levator  Tenderness present at Right:    Upper Trap and Erector Spinae  Tenderness not present at Right:      Levator                                                  General Evaluation:                      Balance:  Balance wnl general: FT EO: 30 sec, FT EC: 30 sec, FT EO on Foam: 26 sec w/ increased compensatory patterns, Tandem R EO: 30 sec w/  compensatory patterns, Tandem L: 5 sec.                                                         Assessment & Plan   CLINICAL IMPRESSIONS  Medical Diagnosis: cervicalgia    Treatment Diagnosis: neck pain, frequent falls, difficulty balancing   Impression/Assessment: Patient is a 74 year old female with neck pain and balance complaints.  The following significant findings have been identified: Pain, Decreased ROM/flexibility, Decreased strength, Impaired balance, Decreased proprioception, Impaired gait, Impaired muscle performance, Decreased activity tolerance, and Impaired posture. These impairments interfere with their ability to perform self care tasks, recreational activities, household chores, driving , household mobility, and community mobility as compared to previous level of function.     Clinical Decision Making (Complexity):  Clinical Presentation: Stable/Uncomplicated  Clinical Presentation Rationale: based on medical and personal factors listed in PT evaluation  Clinical Decision Making (Complexity): Moderate complexity    PLAN OF CARE  Treatment Interventions:  Interventions: Gait Training, Manual Therapy, Neuromuscular Re-education, Therapeutic Activity, Therapeutic Exercise, Self-Care/Home Management    Long Term Goals     PT Goal 1  Goal Identifier: driving  Goal Description: Patient will be able to rotate head at least 50% each direction without neck pain in order for conversation and checking her blindspot while driving.  Target Date: 01/06/25  PT Goal 2  Goal Identifier: falls  Goal Description: Patient will report 1 month without falls in order to prevent injury and improve walking.  Target Date: 01/06/25      Frequency of Treatment: every other week  Duration of Treatment: 12 weeks    Recommended Referrals to Other Professionals:   Education Assessment:   Learner/Method: Patient;Listening;Demonstration;Pictures/Video  Education Comments: Educated on findings of exam, encouraged her to find a PT  while wintering in [place] as she will require weekly sessions and progressions as these exercises become easy, educated on frequency/duration for Dec when she will be back in [place].    Risks and benefits of evaluation/treatment have been explained.   Patient/Family/caregiver agrees with Plan of Care.     Evaluation Time:     PT Eval, Low Complexity Minutes (41796): 25       Signing Clinician: IKE Looney [facility]                                                                                   OUTPATIENT PHYSICAL THERAPY      PLAN OF TREATMENT FOR OUTPATIENT REHABILITATION   Patient's Last Name, First Name, Radha Hill YOB: 1950   Provider's Name   [facility]   Medical Record No.  [id number]     Onset Date: 09/14/24  Start of Care Date: 10/14/24     Medical Diagnosis:  cervicalgia      PT Treatment Diagnosis:  neck pain, frequent falls, difficulty balancing Plan of Treatment  Frequency/Duration: every other week/ 12 weeks    Certification date from 10/14/24 to 01/06/25         See note for plan of treatment details and functional goals     Keke Ayoub, IKE                         I CERTIFY THE NEED FOR THESE SERVICES FURNISHED UNDER        THIS PLAN OF TREATMENT AND WHILE UNDER MY CARE .             Physician Signature               Date    X_____________________________________________________                  Referring Provider:  Jennifer Lombardo    Initial Assessment  See Epic Evaluation- Start of Care Date: 10/14/24

## 2024-11-02 ENCOUNTER — HEALTH MAINTENANCE LETTER (OUTPATIENT)
Age: 74
End: 2024-11-02

## 2024-11-20 PROBLEM — R29.818 DIFFICULTY BALANCING: Status: RESOLVED | Noted: 2024-10-14 | Resolved: 2024-11-20

## 2024-11-20 PROBLEM — R29.6 FALLS FREQUENTLY: Status: RESOLVED | Noted: 2024-10-14 | Resolved: 2024-11-20

## 2025-05-04 ENCOUNTER — HEALTH MAINTENANCE LETTER (OUTPATIENT)
Age: 75
End: 2025-05-04

## 2025-07-18 ENCOUNTER — TRANSFERRED RECORDS (OUTPATIENT)
Dept: HEALTH INFORMATION MANAGEMENT | Facility: CLINIC | Age: 75
End: 2025-07-18
Payer: MEDICARE

## 2025-07-28 ENCOUNTER — PATIENT OUTREACH (OUTPATIENT)
Dept: CARE COORDINATION | Facility: CLINIC | Age: 75
End: 2025-07-28
Payer: MEDICARE

## 2025-07-29 ENCOUNTER — OFFICE VISIT (OUTPATIENT)
Dept: PHARMACY | Facility: PHYSICIAN GROUP | Age: 75
End: 2025-07-29

## 2025-07-29 VITALS — WEIGHT: 129 LBS | BODY MASS INDEX: 25.19 KG/M2

## 2025-07-29 DIAGNOSIS — E78.2 MIXED HYPERLIPIDEMIA: Primary | ICD-10-CM

## 2025-07-29 DIAGNOSIS — R25.1 TREMOR: ICD-10-CM

## 2025-07-29 DIAGNOSIS — Z78.9 TAKES DIETARY SUPPLEMENTS: ICD-10-CM

## 2025-07-29 DIAGNOSIS — K21.9 GASTROESOPHAGEAL REFLUX DISEASE WITHOUT ESOPHAGITIS: ICD-10-CM

## 2025-07-29 DIAGNOSIS — R52 PAIN: ICD-10-CM

## 2025-07-29 DIAGNOSIS — G47.00 INSOMNIA, UNSPECIFIED TYPE: ICD-10-CM

## 2025-07-29 DIAGNOSIS — F41.9 ANXIETY AND DEPRESSION: ICD-10-CM

## 2025-07-29 DIAGNOSIS — F32.A ANXIETY AND DEPRESSION: ICD-10-CM

## 2025-07-29 PROCEDURE — 99207 PR NO CHARGE LOS: CPT | Performed by: PHARMACIST

## 2025-07-29 NOTE — PATIENT INSTRUCTIONS
Recommendations from today's MTM visit:                                                      1. It seems like taking the propranolol less regularly may have helped with reducing fall frequency.    2. Other options to consider (one at a time):  - Reduce and/or taper off of Wellbutrin -- this can make it hard to fall asleep  - Consider stopping the modafinil if it doesn't help much with waking up, and it may also make it difficult to fall asleep  - Consider stopping the Lunesta, which can contribute to dizziness and confusion    3. I would really encourage you to eliminate screens in the bedroom    It was great to speak with you today.  I value your experience and would be very thankful for your time with providing feedback on our clinic survey. You may receive a survey via email or text message in the next few days.     To schedule another MTM appointment, please call the clinic directly or you may call the scheduling line at [phone].    My Clinical Pharmacist's contact information:                                                      Please contact the clinic with any questions or concerns you have.      Rosemarie Lara, Pharm.D., [facility]  Medication Therapy Management Pharmacist  [phone]

## 2025-07-29 NOTE — PROGRESS NOTES
Medication Therapy Management (MTM) Encounter    ASSESSMENT:                            Medication Adherence/Access: No issues identified.    Hyperlipidemia   Stable     GERD    Stable     Insomnia   Not well controlled even with medication. Sleep hygiene is poor with multiple screens in the bedroom. Both bupropion and modafinil may make it more difficult to fall asleep, and unclear if modafinil is helpful at this time. Could discontinue modafinil and consider reducing or tapering off bupropion if mood is unaffected. If these meds are contributing to insomnia, then there may be less need for Lunesta on a nightly basis, which can contribute to dizziness and confusion    Tremor  Stable, so far has not had a fall since using propranolol on a rare occasion    Mental Health   Anxiety and Depression  Stable. However, it is possible that bupropion is making it difficult to fall asleep. May consider reducing the dose and monitoring for changes to mood and ability to fall asleep     Pain   Stable. Very rare use of hydrocodone and no use of cyclobenzaprine, which could contribute to fatigue, dizziness, and falls.     Supplements   Stable    PLAN:                            1. It seems like taking the propranolol less regularly may have helped with reducing fall frequency.    2. Other options to consider (one at a time):  - Reduce and/or taper off of Wellbutrin -- this can make it hard to fall asleep  - Consider stopping the modafinil if it doesn't help much with waking up, and it may also make it difficult to fall asleep  - Consider stopping the Lunesta, which can contribute to dizziness and confusion    3. I would really encourage you to eliminate screens in the bedroom    Follow-up: as needed for medication questions or concerns.    SUBJECTIVE/OBJECTIVE:                          Radha Bull is a 75 year old female seen for an initial visit. She was referred to me from Jennifer Lombardo. Patient was accompanied by her  , Fausto.     Reason for visit: Medication review.  Frequent falls.    Allergies/ADRs: Reviewed in chart  Past Medical History: Reviewed in chart  Tobacco: She reports that she has never smoked. She has never used smokeless tobacco.  Alcohol: 1-3 beverages / week  Caffeine: 1/2c coffee/day  Medication Adherence/Access: no issues reported.    Frequent Falls  A few years, but started getting worse last summer  4x/month  Has hit head, broken nose    Hyperlipidemia   Rosuvastatin 20 mg daily  Patient reports no significant myalgias or other side effects.  Lab Test 09/12/24  1555 08/29/23  1632   CHOL 249* 196   HDL 70 77   * 84   TRIG 111 175*        GERD    Omeprazole 20 mg daily  Patient reports no current symptoms.   Originallty started due to some reflux and has been helpful       Insomnia   Lunesta 3 mg half tablet nightly; takes a full tablet maybe 2x/week, prescribed by psychiatry and has been on a long time  Melatonin 5mg 30 minutes before bed - feels this has been helpful  Modafinil 200 mg every morning; takes as a result of next day grogginess, been on for about 2 years  Pramipexole 0.125mg at bedtime PRN RLS - takes rarely, maybe once a month  Meds prescribed by psychiatry  Patient reports trouble falling asleep, daytime sleepiness, and pain.   Watches TV and ipad in bed    Medication History:  Trazodone - grogginess  Ambien - sleep walking, sleep eating      Tremor  Propranolol 20mg PRN - cut back from taking it daily about 2 weeks ago and notes no falls in that time       Mental Health   Anxiety and Depression  Escitalopram 10mg daily  Bupropion  mg daily  Patient reports no current medication side effects.  Patient reports symptoms are stable.  Patient reports beneficial effects from this combination  Previously attempted to discontinue Lexapro but resumed at lower dose due to difficulties     Pain   Acetaminophen 650mg 2 tablets PRN  - 4-5x/week - works most of the  time  Hydrocodone-acetaminophen 5-325 mg every 6 hours as needed for neck pain - takes rarely, maybe 1/2 tab every few months  Cyclobenzaprine 10mg PRN - took x1 month and it didn't really help with neck pain or sleep  Rizatriptan 5 mg at onset of migraine as needed maybe once monthly or less; takes 1/2 tablet most time and works when needed  Voltaren gel - a few times per week  Lidocaine patch to lower back - works well  Pain type/location: Neck/low back  Patient feels that current therapy is effective.        Supplements   Biotin 1000 mcg twice daily  Vitamin B complex daily  Vitamin D 2000 IU daily  Probiotic twice daily for IBS  Immodium 2mg - not currently taking  No reported issues at this time.        Today's Vitals: Wt 129 lb (58.5 kg)   BMI 25.19 kg/m    ----------------      I spent 60 minutes with this patient today. All changes were made via collaborative practice agreement with Jennifer Lombardo PA-C.     A summary of these recommendations was given to the patient.    Rosemarie Lara, Pharm.D., [facility]  Medication Therapy Management Pharmacist  [phone]          Medication Therapy Recommendations  Insomnia, unspecified type   1 Current Medication: buPROPion (WELLBUTRIN XL) 300 MG 24 hr tablet   Current Medication Sig: Take 300 mg by mouth every morning   Rationale: Undesirable effect - Adverse medication event - Safety   Recommendation: Provide Education   Status: Patient Agreed - Adherence/Education   Identified Date: 7/29/2025 Completed Date: 7/29/2025   Note: Modafinil and lunesta

## 2025-08-21 ENCOUNTER — OFFICE VISIT (OUTPATIENT)
Dept: OPHTHALMOLOGY | Facility: CLINIC | Age: 75
End: 2025-08-21
Attending: OPHTHALMOLOGY
Payer: MEDICARE

## 2025-08-21 DIAGNOSIS — H04.123 DRY EYE SYNDROME OF BOTH EYES: ICD-10-CM

## 2025-08-21 DIAGNOSIS — H53.2 VERTICAL DIPLOPIA: ICD-10-CM

## 2025-08-21 DIAGNOSIS — H51.9 CONVERGENCE INSUFFICIENCY OR PALSY IN BINOCULAR EYE MOVEMENT: Primary | ICD-10-CM

## 2025-08-21 PROCEDURE — 92060 SENSORIMOTOR EXAMINATION: CPT | Performed by: OPHTHALMOLOGY

## 2025-08-21 RX ORDER — PREDNISOLONE ACETATE 10 MG/ML
SUSPENSION/ DROPS OPHTHALMIC
Qty: 10 ML | Refills: 0 | Status: SHIPPED | OUTPATIENT
Start: 2025-08-21 | End: 2025-09-11

## 2025-08-21 ASSESSMENT — REFRACTION_WEARINGRX
OD_AXIS: 002
OS_SPHERE: +0.50
OS_AXIS: 009
OS_ADD: +2.50
OS_CYLINDER: +1.00
OD_CYLINDER: +0.75
OD_ADD: +2.50
OD_SPHERE: +1.00
SPECS_TYPE: PAL

## 2025-08-21 ASSESSMENT — CUP TO DISC RATIO
OS_RATIO: 0.5
OD_RATIO: 0.4

## 2025-08-21 ASSESSMENT — VISUAL ACUITY
METHOD: SNELLEN - LINEAR
OS_CC: 20/30
OD_CC: 20/30
OD_PH_CC+: -2
OS_CC+: +1
OD_PH_CC: 20/25
OD_CC+: +1
CORRECTION_TYPE: GLASSES

## 2025-08-21 ASSESSMENT — EXTERNAL EXAM - LEFT EYE: OS_EXAM: NORMAL

## 2025-08-21 ASSESSMENT — CONF VISUAL FIELD
OD_INFERIOR_TEMPORAL_RESTRICTION: 0
OD_SUPERIOR_TEMPORAL_RESTRICTION: 0
OS_INFERIOR_TEMPORAL_RESTRICTION: 0
OD_NORMAL: 1
OD_SUPERIOR_NASAL_RESTRICTION: 0
OS_NORMAL: 1
OS_INFERIOR_NASAL_RESTRICTION: 0
OS_SUPERIOR_TEMPORAL_RESTRICTION: 0
OS_SUPERIOR_NASAL_RESTRICTION: 0
OD_INFERIOR_NASAL_RESTRICTION: 0
METHOD: COUNTING FINGERS

## 2025-08-21 ASSESSMENT — SLIT LAMP EXAM - LIDS
COMMENTS: NORMAL
COMMENTS: NORMAL

## 2025-08-21 ASSESSMENT — EXTERNAL EXAM - RIGHT EYE: OD_EXAM: NORMAL

## 2025-08-21 ASSESSMENT — TONOMETRY
OD_IOP_MMHG: 13
OS_IOP_MMHG: 11
IOP_METHOD: ICARE

## (undated) DEVICE — SUCTION IRR SYSTEM W/O TIP INTERPULSE HANDPIECE 0210-100-000

## (undated) DEVICE — SOL NACL 0.9% IRRIG 3000ML BAG 07972-08

## (undated) DEVICE — GLOVE PROTEXIS BLUE W/NEU-THERA 8.0  2D73EB80

## (undated) DEVICE — GLOVE PROTEXIS BLUE W/NEU-THERA 6.5  2D73EB65

## (undated) DEVICE — ESU PENCIL W/SMOKE EVAC NEPTUNE STRYKER 0703-046-000

## (undated) DEVICE — DRAPE ARTHROSCOPY SHOULDER BEACHCHAIR 29369

## (undated) DEVICE — PACK SET-UP STD 9102

## (undated) DEVICE — SU VICRYL 3-0 SH 27" UND J416H

## (undated) DEVICE — MANIFOLD NEPTUNE 4 PORT 700-20

## (undated) DEVICE — IMM SLING SHOULDER LG BUCKLE 79-84247

## (undated) DEVICE — SU VICRYL 0 CT-1 27" J340H

## (undated) DEVICE — LINEN TOWEL PACK X5 5464

## (undated) DEVICE — STPL SKIN 35W 6.9MM  PXW35

## (undated) DEVICE — DRAPE SHEET REV FOLD 3/4 9349

## (undated) DEVICE — SU FIBERWIRE 2 38"  AR-7200

## (undated) DEVICE — PIN ALIGNMENT 2.5X200MM

## (undated) DEVICE — SOL WATER IRRIG 1000ML BOTTLE 2F7114

## (undated) DEVICE — GLOVE PROTEXIS W/NEU-THERA 7.5  2D73TE75

## (undated) DEVICE — PACK OPEN SHOULDER SOP15OCFSC

## (undated) DEVICE — SPONGE LAP 18X18" X8435

## (undated) DEVICE — DRAPE CONVERTORS U-DRAPE 60X72" 8476

## (undated) DEVICE — DRSG ABDOMINAL 07 1/2X8" 7197D

## (undated) DEVICE — WRAP EZY SHOULDER 2703-06-GEL2

## (undated) DEVICE — SOL NACL 0.9% IRRIG 1000ML BOTTLE 2F7124

## (undated) DEVICE — Device

## (undated) DEVICE — GLOVE PROTEXIS POWDER FREE 6.0 ORTHOPEDIC 2D73ET60

## (undated) DEVICE — HOOD FLYTE W/PEELAWAY 408-800-100

## (undated) DEVICE — NDL 22GA 1.5"

## (undated) DEVICE — PREP CHLORAPREP 26ML TINTED ORANGE  260815

## (undated) DEVICE — SYR 50ML CATH TIP W/O NDL 309620

## (undated) DEVICE — SU MONOCRYL 4-0 PS-2 18" UND Y496G

## (undated) DEVICE — BONE CLEANING TIP INTERPULSE  0210-010-000

## (undated) DEVICE — SOLUTION WOUND CLEANSING 3/4OZ 10% PVP EA-L3011FB-50

## (undated) DEVICE — ESU ELEC BLADE 4" COATED

## (undated) DEVICE — ESU GROUND PAD UNIVERSAL W/O CORD

## (undated) DEVICE — DRAPE STERI U 1015

## (undated) DEVICE — SUCTION TIP YANKAUER STR K87

## (undated) DEVICE — BLADE SAW SAGITTAL STRK MED WIDE 25X73X0.89MM 2108-105-000

## (undated) RX ORDER — OXYCODONE HYDROCHLORIDE 5 MG/1
TABLET ORAL
Status: DISPENSED
Start: 2021-10-19

## (undated) RX ORDER — ACETAMINOPHEN 325 MG/1
TABLET ORAL
Status: DISPENSED
Start: 2021-10-18

## (undated) RX ORDER — CEFAZOLIN SODIUM 1 G/3ML
INJECTION, POWDER, FOR SOLUTION INTRAMUSCULAR; INTRAVENOUS
Status: DISPENSED
Start: 2021-10-19

## (undated) RX ORDER — ASPIRIN 81 MG/1
TABLET ORAL
Status: DISPENSED
Start: 2021-10-18

## (undated) RX ORDER — PROPOFOL 10 MG/ML
INJECTION, EMULSION INTRAVENOUS
Status: DISPENSED
Start: 2021-10-18

## (undated) RX ORDER — CEFAZOLIN SODIUM 1 G/3ML
INJECTION, POWDER, FOR SOLUTION INTRAMUSCULAR; INTRAVENOUS
Status: DISPENSED
Start: 2021-10-18

## (undated) RX ORDER — SCOLOPAMINE TRANSDERMAL SYSTEM 1 MG/1
PATCH, EXTENDED RELEASE TRANSDERMAL
Status: DISPENSED
Start: 2021-10-18

## (undated) RX ORDER — ACETAMINOPHEN 325 MG/1
TABLET ORAL
Status: DISPENSED
Start: 2021-10-19

## (undated) RX ORDER — ONDANSETRON 2 MG/ML
INJECTION INTRAMUSCULAR; INTRAVENOUS
Status: DISPENSED
Start: 2021-10-18

## (undated) RX ORDER — HYDROMORPHONE HYDROCHLORIDE 1 MG/ML
INJECTION, SOLUTION INTRAMUSCULAR; INTRAVENOUS; SUBCUTANEOUS
Status: DISPENSED
Start: 2021-10-19

## (undated) RX ORDER — GLYCOPYRROLATE 0.2 MG/ML
INJECTION, SOLUTION INTRAMUSCULAR; INTRAVENOUS
Status: DISPENSED
Start: 2021-10-18

## (undated) RX ORDER — DEXAMETHASONE SODIUM PHOSPHATE 4 MG/ML
INJECTION, SOLUTION INTRA-ARTICULAR; INTRALESIONAL; INTRAMUSCULAR; INTRAVENOUS; SOFT TISSUE
Status: DISPENSED
Start: 2021-10-18

## (undated) RX ORDER — LIDOCAINE HYDROCHLORIDE 20 MG/ML
INJECTION, SOLUTION EPIDURAL; INFILTRATION; INTRACAUDAL; PERINEURAL
Status: DISPENSED
Start: 2021-10-18

## (undated) RX ORDER — CEFAZOLIN SODIUM 2 G/100ML
INJECTION, SOLUTION INTRAVENOUS
Status: DISPENSED
Start: 2021-10-18

## (undated) RX ORDER — TRANEXAMIC ACID 650 MG/1
TABLET ORAL
Status: DISPENSED
Start: 2021-10-18

## (undated) RX ORDER — NEOSTIGMINE METHYLSULFATE 1 MG/ML
VIAL (ML) INJECTION
Status: DISPENSED
Start: 2021-10-18

## (undated) RX ORDER — ONDANSETRON 2 MG/ML
INJECTION INTRAMUSCULAR; INTRAVENOUS
Status: DISPENSED
Start: 2021-10-19

## (undated) RX ORDER — FENTANYL CITRATE 50 UG/ML
INJECTION, SOLUTION INTRAMUSCULAR; INTRAVENOUS
Status: DISPENSED
Start: 2021-10-18